# Patient Record
Sex: FEMALE | Race: WHITE | HISPANIC OR LATINO | URBAN - METROPOLITAN AREA
[De-identification: names, ages, dates, MRNs, and addresses within clinical notes are randomized per-mention and may not be internally consistent; named-entity substitution may affect disease eponyms.]

---

## 2023-10-21 ENCOUNTER — TRANSCRIBE ORDERS (OUTPATIENT)
Dept: LAB | Facility: CLINIC | Age: 48
End: 2023-10-21

## 2023-10-21 ENCOUNTER — APPOINTMENT (OUTPATIENT)
Dept: LAB | Facility: CLINIC | Age: 48
End: 2023-10-21
Payer: COMMERCIAL

## 2023-10-21 DIAGNOSIS — D50.8 IRON DEFICIENCY ANEMIA SECONDARY TO INADEQUATE DIETARY IRON INTAKE: ICD-10-CM

## 2023-10-21 DIAGNOSIS — N95.1 SYMPTOMATIC MENOPAUSAL OR FEMALE CLIMACTERIC STATES: ICD-10-CM

## 2023-10-21 DIAGNOSIS — N95.1 SYMPTOMATIC MENOPAUSAL OR FEMALE CLIMACTERIC STATES: Primary | ICD-10-CM

## 2023-10-21 LAB
ESTRADIOL SERPL-MCNC: 98 PG/ML
FERRITIN SERPL-MCNC: 3 NG/ML (ref 11–307)
IRON SATN MFR SERPL: 2 % (ref 15–50)
IRON SERPL-MCNC: 16 UG/DL (ref 50–212)
PROGEST SERPL-MCNC: <0.1 NG/ML
PROLACTIN SERPL-MCNC: 11.62 NG/ML (ref 3.34–26.72)
RETICS # AUTO: NORMAL 10*3/UL (ref 14097–95744)
RETICS # CALC: 1.65 % (ref 0.37–1.87)
TIBC SERPL-MCNC: 688 UG/DL (ref 250–450)
TRANSFERRIN SERPL-MCNC: 375 MG/DL (ref 203–362)
UIBC SERPL-MCNC: 672 UG/DL (ref 155–355)

## 2023-10-21 PROCEDURE — 82728 ASSAY OF FERRITIN: CPT

## 2023-10-21 PROCEDURE — 82627 DEHYDROEPIANDROSTERONE: CPT

## 2023-10-21 PROCEDURE — 84403 ASSAY OF TOTAL TESTOSTERONE: CPT

## 2023-10-21 PROCEDURE — 84466 ASSAY OF TRANSFERRIN: CPT

## 2023-10-21 PROCEDURE — 84146 ASSAY OF PROLACTIN: CPT

## 2023-10-21 PROCEDURE — 85045 AUTOMATED RETICULOCYTE COUNT: CPT

## 2023-10-21 PROCEDURE — 82670 ASSAY OF TOTAL ESTRADIOL: CPT

## 2023-10-21 PROCEDURE — 83550 IRON BINDING TEST: CPT

## 2023-10-21 PROCEDURE — 84144 ASSAY OF PROGESTERONE: CPT

## 2023-10-21 PROCEDURE — 36415 COLL VENOUS BLD VENIPUNCTURE: CPT

## 2023-10-21 PROCEDURE — 83540 ASSAY OF IRON: CPT

## 2023-10-21 PROCEDURE — 84402 ASSAY OF FREE TESTOSTERONE: CPT

## 2023-10-21 PROCEDURE — 84270 ASSAY OF SEX HORMONE GLOBUL: CPT

## 2023-10-22 LAB
DHEA-S SERPL-MCNC: 186 UG/DL (ref 41.2–243.7)
SHBG SERPL-SCNC: 91.2 NMOL/L (ref 24.6–122)

## 2023-10-23 LAB
TESTOST FREE SERPL-MCNC: 1.2 PG/ML (ref 0–4.2)
TESTOST SERPL-MCNC: 146 NG/DL (ref 4–50)

## 2024-04-25 ENCOUNTER — OFFICE VISIT (OUTPATIENT)
Dept: FAMILY MEDICINE CLINIC | Facility: CLINIC | Age: 49
End: 2024-04-25
Payer: COMMERCIAL

## 2024-04-25 VITALS
OXYGEN SATURATION: 99 % | SYSTOLIC BLOOD PRESSURE: 120 MMHG | HEART RATE: 66 BPM | WEIGHT: 221 LBS | DIASTOLIC BLOOD PRESSURE: 80 MMHG | HEIGHT: 64 IN | BODY MASS INDEX: 37.73 KG/M2

## 2024-04-25 DIAGNOSIS — Z78.0 MENOPAUSE: ICD-10-CM

## 2024-04-25 DIAGNOSIS — D50.9 IRON DEFICIENCY ANEMIA, UNSPECIFIED IRON DEFICIENCY ANEMIA TYPE: ICD-10-CM

## 2024-04-25 DIAGNOSIS — Z12.39 ENCOUNTER FOR SCREENING FOR MALIGNANT NEOPLASM OF BREAST, UNSPECIFIED SCREENING MODALITY: ICD-10-CM

## 2024-04-25 DIAGNOSIS — Z90.710 H/O: HYSTERECTOMY: ICD-10-CM

## 2024-04-25 DIAGNOSIS — Z90.49 HISTORY OF APPENDECTOMY: ICD-10-CM

## 2024-04-25 DIAGNOSIS — J30.1 SEASONAL ALLERGIC RHINITIS DUE TO POLLEN: Primary | ICD-10-CM

## 2024-04-25 DIAGNOSIS — E66.09 CLASS 2 OBESITY DUE TO EXCESS CALORIES WITHOUT SERIOUS COMORBIDITY WITH BODY MASS INDEX (BMI) OF 37.0 TO 37.9 IN ADULT: ICD-10-CM

## 2024-04-25 DIAGNOSIS — A04.8 H. PYLORI INFECTION: ICD-10-CM

## 2024-04-25 DIAGNOSIS — Z78.0 POSTMENOPAUSAL ESTROGEN DEFICIENCY: ICD-10-CM

## 2024-04-25 DIAGNOSIS — K63.5 POLYP OF COLON, UNSPECIFIED PART OF COLON, UNSPECIFIED TYPE: ICD-10-CM

## 2024-04-25 PROBLEM — G51.0 BELL'S PALSY: Status: ACTIVE | Noted: 2024-04-25

## 2024-04-25 PROBLEM — C64.9 RENAL CELL CANCER (HCC): Status: ACTIVE | Noted: 2024-04-25

## 2024-04-25 PROBLEM — Z90.3 H/O GASTRIC SLEEVE: Status: ACTIVE | Noted: 2024-04-25

## 2024-04-25 PROBLEM — E66.812 CLASS 2 OBESITY DUE TO EXCESS CALORIES WITHOUT SERIOUS COMORBIDITY WITH BODY MASS INDEX (BMI) OF 37.0 TO 37.9 IN ADULT: Status: ACTIVE | Noted: 2024-04-25

## 2024-04-25 PROBLEM — J45.909 CHILDHOOD ASTHMA: Status: ACTIVE | Noted: 2024-04-25

## 2024-04-25 PROBLEM — D64.9 ANEMIA: Status: ACTIVE | Noted: 2024-04-25

## 2024-04-25 PROBLEM — Z90.5 H/O LEFT NEPHRECTOMY: Status: ACTIVE | Noted: 2024-04-25

## 2024-04-25 PROBLEM — S83.281A ACUTE LATERAL MENISCUS TEAR OF RIGHT KNEE: Status: ACTIVE | Noted: 2024-04-25

## 2024-04-25 PROCEDURE — 99204 OFFICE O/P NEW MOD 45 MIN: CPT | Performed by: PHYSICIAN ASSISTANT

## 2024-04-25 RX ORDER — PROGESTERONE 100 MG/1
CAPSULE ORAL
COMMUNITY
Start: 2024-01-21

## 2024-04-25 RX ORDER — MONTELUKAST SODIUM 10 MG/1
10 TABLET ORAL
Qty: 30 TABLET | Refills: 5 | Status: SHIPPED | OUTPATIENT
Start: 2024-04-25

## 2024-04-25 RX ORDER — FLUTICASONE PROPIONATE 50 MCG
1 SPRAY, SUSPENSION (ML) NASAL DAILY
Start: 2024-04-25

## 2024-04-25 RX ORDER — OLOPATADINE HYDROCHLORIDE 1 MG/ML
1 SOLUTION/ DROPS OPHTHALMIC 2 TIMES DAILY
Start: 2024-04-25

## 2024-04-25 RX ORDER — FEXOFENADINE HCL 180 MG/1
180 TABLET ORAL DAILY
Start: 2024-04-25

## 2024-04-25 NOTE — PATIENT INSTRUCTIONS
Assessment/plan:  1.  Allergic rhinitis-not at goal.  Will add Singulair 10 mg daily.  Continue Allegra 180 mg, Flonase, and Patanol.  Consider further referral to allergist if symptoms or not improving.  2.  Childhood asthma-patient has seemed to outgrow this.  She has not needed inhaler therapy in 2 decades.  She did have some exacerbation of symptoms during COVID but has been fine since.  3.  Premature menopause-patient is currently using hormone replacement therapy through company, [employer] in [facility].  Would recommend referral to gynecology for evaluation.  Will order labs and assess DEXA scan.  4.  Colon polyps-recommend referral to gastroenterology for reevaluation.  Last colonoscopy was approximately 8 or 9 years ago.  5.  Anemia-recommend reassessing ferritin level and hemoglobin.  6.  History of meniscus repair-stable status post surgery  7.  Bell's palsy-resolved.  8.  Obesity-patient continues healthy diet and exercise efforts.  Consider future referral to weight management team.  9.  Renal cell cancer-stable status post nephrectomy.  Will reassess renal function testing.

## 2024-04-25 NOTE — PROGRESS NOTES
Name: Debbie Hendricks      : 1975      MRN: [id number]  Encounter Provider: Broderick Chavez PA-C  Encounter Date: 2024   Encounter department: Pending sale to [facility]    Assessment & Plan     Patient Instructions   Assessment/plan:  1.  Allergic rhinitis-not at goal.  Will add Singulair 10 mg daily.  Continue Allegra 180 mg, Flonase, and Patanol.  Consider further referral to allergist if symptoms or not improving.  2.  Childhood asthma-patient has seemed to outgrow this.  She has not needed inhaler therapy in 2 decades.  She did have some exacerbation of symptoms during COVID but has been fine since.  3.  Premature menopause-patient is currently using hormone replacement therapy through [employer] in [facility].  Would recommend referral to gynecology for evaluation.  Will order labs and assess DEXA scan.  4.  Colon polyps-recommend referral to gastroenterology for reevaluation.  Last colonoscopy was approximately 8 or 9 years ago.  5.  Anemia-recommend reassessing ferritin level and hemoglobin.  6.  History of meniscus repair-stable status post surgery  7.  Bell's palsy-resolved.  8.  Obesity-patient continues healthy diet and exercise efforts.  Consider future referral to weight management team.  9.  Renal cell cancer-stable status post nephrectomy.  Will reassess renal function testing.   1. Seasonal allergic rhinitis due to pollen  -     CBC and differential  -     Comprehensive metabolic panel  -     Lipid Panel with Direct LDL reflex  -     TSH, 3rd generation with Free T4 reflex  -     Follicle stimulating hormone; Future  -     Luteinizing hormone; Future  -     Estrogens, total; Future  -     Progesterone; Future  -     fexofenadine (ALLEGRA) 180 MG tablet; Take 1 tablet (180 mg total) by mouth daily  -     olopatadine (PATANOL) 0.1 % ophthalmic solution; Administer 1 drop to both eyes 2 (two) times a day  -     fluticasone (FLONASE) 50 mcg/act nasal spray; 1 spray into  each nostril daily  -     DHEA-sulfate; Future  -     Insulin-like growth factor 1 (IGF-1); Future  -     montelukast (SINGULAIR) 10 mg tablet; Take 1 tablet (10 mg total) by mouth daily at bedtime    2. Menopause  -     CBC and differential  -     Comprehensive metabolic panel  -     Lipid Panel with Direct LDL reflex  -     TSH, 3rd generation with Free T4 reflex  -     Follicle stimulating hormone; Future  -     Luteinizing hormone; Future  -     Estrogens, total; Future  -     Progesterone; Future  -     fexofenadine (ALLEGRA) 180 MG tablet; Take 1 tablet (180 mg total) by mouth daily  -     olopatadine (PATANOL) 0.1 % ophthalmic solution; Administer 1 drop to both eyes 2 (two) times a day  -     fluticasone (FLONASE) 50 mcg/act nasal spray; 1 spray into each nostril daily  -     Ambulatory Referral to Obstetrics / Gynecology; Future  -     DHEA-sulfate; Future  -     Insulin-like growth factor 1 (IGF-1); Future    3. Class 2 obesity due to excess calories without serious comorbidity with body mass index (BMI) of 37.0 to 37.9 in adult  -     CBC and differential  -     Comprehensive metabolic panel  -     Lipid Panel with Direct LDL reflex  -     TSH, 3rd generation with Free T4 reflex  -     Follicle stimulating hormone; Future  -     Luteinizing hormone; Future  -     Estrogens, total; Future  -     Progesterone; Future  -     fexofenadine (ALLEGRA) 180 MG tablet; Take 1 tablet (180 mg total) by mouth daily  -     olopatadine (PATANOL) 0.1 % ophthalmic solution; Administer 1 drop to both eyes 2 (two) times a day  -     fluticasone (FLONASE) 50 mcg/act nasal spray; 1 spray into each nostril daily  -     DHEA-sulfate; Future  -     Insulin-like growth factor 1 (IGF-1); Future    4. History of appendectomy    5. H/O: hysterectomy  -     Ambulatory Referral to Obstetrics / Gynecology; Future    6. Postmenopausal estrogen deficiency  -     CBC and differential  -     Comprehensive metabolic panel  -     Lipid Panel  with Direct LDL reflex  -     TSH, 3rd generation with Free T4 reflex  -     Follicle stimulating hormone; Future  -     Luteinizing hormone; Future  -     Estrogens, total; Future  -     Progesterone; Future  -     fexofenadine (ALLEGRA) 180 MG tablet; Take 1 tablet (180 mg total) by mouth daily  -     olopatadine (PATANOL) 0.1 % ophthalmic solution; Administer 1 drop to both eyes 2 (two) times a day  -     fluticasone (FLONASE) 50 mcg/act nasal spray; 1 spray into each nostril daily  -     DXA bone density spine hip and pelvis; Future; Expected date: 04/25/2024  -     DHEA-sulfate; Future  -     Insulin-like growth factor 1 (IGF-1); Future    7. Encounter for screening for malignant neoplasm of breast, unspecified screening modality  -     Mammo screening bilateral w 3d & cad; Future    8. Polyp of colon, unspecified part of colon, unspecified type  -     Ambulatory Referral to Gastroenterology; Future    9. Iron deficiency anemia, unspecified iron deficiency anemia type  -     CBC and differential  -     Comprehensive metabolic panel  -     Lipid Panel with Direct LDL reflex  -     TSH, 3rd generation with Free T4 reflex  -     Follicle stimulating hormone; Future  -     Luteinizing hormone; Future  -     Estrogens, total; Future  -     Progesterone; Future  -     fexofenadine (ALLEGRA) 180 MG tablet; Take 1 tablet (180 mg total) by mouth daily  -     olopatadine (PATANOL) 0.1 % ophthalmic solution; Administer 1 drop to both eyes 2 (two) times a day  -     fluticasone (FLONASE) 50 mcg/act nasal spray; 1 spray into each nostril daily  -     Ferritin  -     DHEA-sulfate; Future  -     Insulin-like growth factor 1 (IGF-1); Future    10. H. pylori infection        Depression Screening and Follow-up Plan: Patient was screened for depression during today's encounter. They screened negative with a PHQ-2 score of 0.        Subjective      HPI: This is a 48-year-old female who presents to the office with her  as a  "new patient to the office.  She complains of ongoing problems with seasonal allergies.  She does continue over-the-counter Allegra, Flonase, and Patanol with a little relief.  She is still having some discomfort in the ears and itchy watery eyes at times.  She does have a history of childhood asthma but has not needed an inhaler for 2 decades.  She also has history of premature menopause and had history of hysterectomy.  She also had history of cholecystectomy and appendectomy.  She has also had problems with iron deficiency anemia.  She has had colon polyps and has not had colonoscopy in about 8 or 9 years.  She does continue hormone therapy through a company called [employer] in [facility].  She is interested in getting set up with gynecology in the area for evaluation and she is also due for mammography.      Review of Systems   Constitutional:  Negative for chills, fatigue and fever.   HENT:  Positive for congestion, ear pain, postnasal drip and rhinorrhea. Negative for sinus pressure.    Eyes:  Positive for itching. Negative for visual disturbance.   Respiratory:  Negative for cough, chest tightness and shortness of breath.    Cardiovascular:  Negative for chest pain and palpitations.   Gastrointestinal:  Negative for diarrhea, nausea and vomiting.   Endocrine: Negative for polyuria.   Genitourinary:  Negative for dysuria and frequency.   Musculoskeletal:  Negative for arthralgias and myalgias.   Skin:  Negative for pallor and rash.   Neurological:  Negative for dizziness, weakness, light-headedness, numbness and headaches.   Psychiatric/Behavioral:  Negative for agitation, behavioral problems and sleep disturbance.    All other systems reviewed and are negative.      Current Outpatient Medications on File Prior to Visit   Medication Sig   • Progesterone 100 MG CAPS        Objective     /80 (BP Location: Right arm, Patient Position: Sitting, Cuff Size: Adult)   Pulse 66   Ht 5' 4\" (1.626 m)   Wt " 100 kg (221 lb)   SpO2 99%   BMI 37.93 kg/m²     Physical Exam  Vitals and nursing note reviewed.   Constitutional:       General: She is not in acute distress.     Appearance: She is well-developed.   HENT:      Head: Normocephalic and atraumatic.      Right Ear: External ear normal.      Left Ear: External ear normal.      Nose: Nose normal.      Mouth/Throat:      Pharynx: No oropharyngeal exudate.   Eyes:      Conjunctiva/sclera: Conjunctivae normal.      Pupils: Pupils are equal, round, and reactive to light.   Neck:      Thyroid: No thyromegaly.      Trachea: No tracheal deviation.   Cardiovascular:      Rate and Rhythm: Normal rate and regular rhythm.      Heart sounds: Normal heart sounds. No murmur heard.     No friction rub.   Pulmonary:      Effort: Pulmonary effort is normal. No respiratory distress.      Breath sounds: Normal breath sounds. No wheezing or rales.   Abdominal:      General: Bowel sounds are normal. There is no distension.      Palpations: Abdomen is soft.      Tenderness: There is no abdominal tenderness. There is no guarding or rebound.   Musculoskeletal:         General: No tenderness. Normal range of motion.      Cervical back: Normal range of motion and neck supple.   Lymphadenopathy:      Cervical: No cervical adenopathy.   Skin:     General: Skin is warm and dry.      Findings: No erythema or rash.   Neurological:      Mental Status: She is alert and oriented to person, place, and time.      Cranial Nerves: No cranial nerve deficit.      Coordination: Coordination normal.   Psychiatric:         Behavior: Behavior normal.         Thought Content: Thought content normal.       Broderick Chavez PA-C

## 2024-04-27 ENCOUNTER — APPOINTMENT (OUTPATIENT)
Dept: LAB | Facility: CLINIC | Age: 49
End: 2024-04-27
Payer: COMMERCIAL

## 2024-04-27 DIAGNOSIS — J30.1 SEASONAL ALLERGIC RHINITIS DUE TO POLLEN: ICD-10-CM

## 2024-04-27 DIAGNOSIS — D50.9 IRON DEFICIENCY ANEMIA, UNSPECIFIED IRON DEFICIENCY ANEMIA TYPE: ICD-10-CM

## 2024-04-27 DIAGNOSIS — Z78.0 MENOPAUSE: ICD-10-CM

## 2024-04-27 DIAGNOSIS — Z78.0 POSTMENOPAUSAL ESTROGEN DEFICIENCY: ICD-10-CM

## 2024-04-27 DIAGNOSIS — E66.09 CLASS 2 OBESITY DUE TO EXCESS CALORIES WITHOUT SERIOUS COMORBIDITY WITH BODY MASS INDEX (BMI) OF 37.0 TO 37.9 IN ADULT: ICD-10-CM

## 2024-04-27 LAB
ALBUMIN SERPL BCP-MCNC: 4.2 G/DL (ref 3.5–5)
ALP SERPL-CCNC: 35 U/L (ref 34–104)
ALT SERPL W P-5'-P-CCNC: 10 U/L (ref 7–52)
ANION GAP SERPL CALCULATED.3IONS-SCNC: 10 MMOL/L (ref 4–13)
AST SERPL W P-5'-P-CCNC: 11 U/L (ref 13–39)
BASOPHILS # BLD AUTO: 0.07 THOUSANDS/ÂΜL (ref 0–0.1)
BASOPHILS NFR BLD AUTO: 1 % (ref 0–1)
BILIRUB SERPL-MCNC: 0.4 MG/DL (ref 0.2–1)
BUN SERPL-MCNC: 13 MG/DL (ref 5–25)
CALCIUM SERPL-MCNC: 9.2 MG/DL (ref 8.4–10.2)
CHLORIDE SERPL-SCNC: 104 MMOL/L (ref 96–108)
CHOLEST SERPL-MCNC: 320 MG/DL
CO2 SERPL-SCNC: 26 MMOL/L (ref 21–32)
CREAT SERPL-MCNC: 0.73 MG/DL (ref 0.6–1.3)
EOSINOPHIL # BLD AUTO: 0.26 THOUSAND/ÂΜL (ref 0–0.61)
EOSINOPHIL NFR BLD AUTO: 5 % (ref 0–6)
ERYTHROCYTE [DISTWIDTH] IN BLOOD BY AUTOMATED COUNT: 18.7 % (ref 11.6–15.1)
FERRITIN SERPL-MCNC: 3 NG/ML (ref 11–307)
FSH SERPL-ACNC: 38.4 MIU/ML
GFR SERPL CREATININE-BSD FRML MDRD: 97 ML/MIN/1.73SQ M
GLUCOSE P FAST SERPL-MCNC: 89 MG/DL (ref 65–99)
HCT VFR BLD AUTO: 34.5 % (ref 34.8–46.1)
HDLC SERPL-MCNC: 70 MG/DL
HGB BLD-MCNC: 10.9 G/DL (ref 11.5–15.4)
IMM GRANULOCYTES # BLD AUTO: 0.01 THOUSAND/UL (ref 0–0.2)
IMM GRANULOCYTES NFR BLD AUTO: 0 % (ref 0–2)
LDLC SERPL CALC-MCNC: 205 MG/DL (ref 0–100)
LH SERPL-ACNC: 28.1 MIU/ML
LYMPHOCYTES # BLD AUTO: 1.68 THOUSANDS/ÂΜL (ref 0.6–4.47)
LYMPHOCYTES NFR BLD AUTO: 32 % (ref 14–44)
MCH RBC QN AUTO: 20.4 PG (ref 26.8–34.3)
MCHC RBC AUTO-ENTMCNC: 31.6 G/DL (ref 31.4–37.4)
MCV RBC AUTO: 65 FL (ref 82–98)
MONOCYTES # BLD AUTO: 0.33 THOUSAND/ÂΜL (ref 0.17–1.22)
MONOCYTES NFR BLD AUTO: 6 % (ref 4–12)
NEUTROPHILS # BLD AUTO: 2.89 THOUSANDS/ÂΜL (ref 1.85–7.62)
NEUTS SEG NFR BLD AUTO: 56 % (ref 43–75)
NRBC BLD AUTO-RTO: 0 /100 WBCS
PLATELET # BLD AUTO: 432 THOUSANDS/UL (ref 149–390)
PMV BLD AUTO: 9.2 FL (ref 8.9–12.7)
POTASSIUM SERPL-SCNC: 3.7 MMOL/L (ref 3.5–5.3)
PROGEST SERPL-MCNC: 0.39 NG/ML
PROT SERPL-MCNC: 7.4 G/DL (ref 6.4–8.4)
RBC # BLD AUTO: 5.35 MILLION/UL (ref 3.81–5.12)
SODIUM SERPL-SCNC: 140 MMOL/L (ref 135–147)
TRIGL SERPL-MCNC: 226 MG/DL
TSH SERPL DL<=0.05 MIU/L-ACNC: 2.36 UIU/ML (ref 0.45–4.5)
WBC # BLD AUTO: 5.24 THOUSAND/UL (ref 4.31–10.16)

## 2024-04-27 PROCEDURE — 82627 DEHYDROEPIANDROSTERONE: CPT

## 2024-04-27 PROCEDURE — 82728 ASSAY OF FERRITIN: CPT | Performed by: PHYSICIAN ASSISTANT

## 2024-04-27 PROCEDURE — 85025 COMPLETE CBC W/AUTO DIFF WBC: CPT | Performed by: PHYSICIAN ASSISTANT

## 2024-04-27 PROCEDURE — 36415 COLL VENOUS BLD VENIPUNCTURE: CPT

## 2024-04-27 PROCEDURE — 80053 COMPREHEN METABOLIC PANEL: CPT | Performed by: PHYSICIAN ASSISTANT

## 2024-04-27 PROCEDURE — 84443 ASSAY THYROID STIM HORMONE: CPT | Performed by: PHYSICIAN ASSISTANT

## 2024-04-27 PROCEDURE — 84144 ASSAY OF PROGESTERONE: CPT

## 2024-04-27 PROCEDURE — 83002 ASSAY OF GONADOTROPIN (LH): CPT

## 2024-04-27 PROCEDURE — 80061 LIPID PANEL: CPT | Performed by: PHYSICIAN ASSISTANT

## 2024-04-27 PROCEDURE — 84305 ASSAY OF SOMATOMEDIN: CPT

## 2024-04-27 PROCEDURE — 82672 ASSAY OF ESTROGEN: CPT

## 2024-04-27 PROCEDURE — 83001 ASSAY OF GONADOTROPIN (FSH): CPT

## 2024-04-28 LAB — DHEA-S SERPL-MCNC: 200 UG/DL (ref 41.2–243.7)

## 2024-04-29 ENCOUNTER — TELEPHONE (OUTPATIENT)
Age: 49
End: 2024-04-29

## 2024-04-29 DIAGNOSIS — Z92.241 HISTORY OF ANABOLIC STEROID USE: Primary | ICD-10-CM

## 2024-04-29 LAB — IGF-I SERPL-MCNC: 84 NG/ML (ref 70–225)

## 2024-04-29 NOTE — TELEPHONE ENCOUNTER
Patient requesting lab results done 4/27/24. Some labs are still pending.    Patient requesting Testosterone Free and Iron testing to be added as well.    Patient is scheduled to be seen on 5/3/24 to go over all her labs    Please review and advice.  Thank you

## 2024-04-29 NOTE — TELEPHONE ENCOUNTER
Received call from patient asking if Broderick Chavez can please call her to discuss lab results and need for either Iron infusion or oral iron supplement.  Patient would like to discuss these options with the provider.  Please return patient's call at [phone].

## 2024-04-30 ENCOUNTER — TELEPHONE (OUTPATIENT)
Dept: FAMILY MEDICINE CLINIC | Facility: CLINIC | Age: 49
End: 2024-04-30

## 2024-04-30 ENCOUNTER — TELEPHONE (OUTPATIENT)
Dept: HEMATOLOGY ONCOLOGY | Facility: CLINIC | Age: 49
End: 2024-04-30

## 2024-04-30 DIAGNOSIS — D50.9 IRON DEFICIENCY ANEMIA, UNSPECIFIED IRON DEFICIENCY ANEMIA TYPE: Primary | ICD-10-CM

## 2024-04-30 NOTE — TELEPHONE ENCOUNTER
Testosterone was added.  For the iron level a ferritin was completed and this is significantly low.  Referral for hematology was placed.

## 2024-04-30 NOTE — TELEPHONE ENCOUNTER
Debbie called in response to a referral that was received for patient to establish care with Hematology.     Outreach was made to schedule a consultation.    A consultation was scheduled for patient during this call. Patient is scheduled on 5/13/24 at 2:00 PM with Dr Rainey at the [facility]

## 2024-04-30 NOTE — TELEPHONE ENCOUNTER
----- Message from Elenita Escobedo sent at 4/29/2024  6:23 PM EDT -----  Test results review thru MyChart. Patient agree to see hematology please placed referral. Also patient questioning if she needs a testosterone test to be done.

## 2024-04-30 NOTE — TELEPHONE ENCOUNTER
Pt has been informed of results. Hematology phone number [phone] has been provided she will call after work.

## 2024-04-30 NOTE — TELEPHONE ENCOUNTER
Order for hematology referral placed.  I am not sure what supplements she is taking through her specialist in [facility] but if they would like a testosterone ordered I can place one, otherwise I would not normally test for this in a female.

## 2024-04-30 NOTE — TELEPHONE ENCOUNTER
Pt has been informed of results. Hematology phone number [phone] has been provided she will call after work.

## 2024-05-01 ENCOUNTER — APPOINTMENT (OUTPATIENT)
Dept: LAB | Facility: CLINIC | Age: 49
End: 2024-05-01
Payer: COMMERCIAL

## 2024-05-01 DIAGNOSIS — Z92.241 HISTORY OF ANABOLIC STEROID USE: ICD-10-CM

## 2024-05-01 PROCEDURE — 84403 ASSAY OF TOTAL TESTOSTERONE: CPT

## 2024-05-01 PROCEDURE — 84402 ASSAY OF FREE TESTOSTERONE: CPT

## 2024-05-01 PROCEDURE — 36415 COLL VENOUS BLD VENIPUNCTURE: CPT

## 2024-05-02 LAB
TESTOST FREE SERPL-MCNC: 0.5 PG/ML (ref 0–4.2)
TESTOST SERPL-MCNC: 11 NG/DL (ref 4–50)

## 2024-05-03 LAB — ESTROGEN SERPL-MCNC: 84 PG/ML

## 2024-05-07 ENCOUNTER — TELEPHONE (OUTPATIENT)
Age: 49
End: 2024-05-07

## 2024-05-07 ENCOUNTER — HOSPITAL ENCOUNTER (OUTPATIENT)
Dept: MAMMOGRAPHY | Facility: MEDICAL CENTER | Age: 49
Discharge: HOME | End: 2024-05-07
Payer: COMMERCIAL

## 2024-05-07 VITALS — HEIGHT: 64 IN | BODY MASS INDEX: 37.73 KG/M2 | WEIGHT: 221 LBS

## 2024-05-07 DIAGNOSIS — Z12.31 ENCOUNTER FOR SCREENING MAMMOGRAM FOR MALIGNANT NEOPLASM OF BREAST: ICD-10-CM

## 2024-05-07 DIAGNOSIS — Z12.39 ENCOUNTER FOR SCREENING FOR MALIGNANT NEOPLASM OF BREAST, UNSPECIFIED SCREENING MODALITY: ICD-10-CM

## 2024-05-07 PROCEDURE — 77063 BREAST TOMOSYNTHESIS BI: CPT

## 2024-05-07 PROCEDURE — 77067 SCR MAMMO BI INCL CAD: CPT

## 2024-05-07 NOTE — TELEPHONE ENCOUNTER
Patient requesting an EGD at the same time as colonoscopy / AO was completed when patient requested EGD consult /  appt scheduled for 5/16/2024 with ROLANDO Ramirez in [place].

## 2024-05-07 NOTE — TELEPHONE ENCOUNTER
APPOINTMENT REQUESTED     05/07/24  Screened by: Padmini Dupree    Referring Provider PCP    Pre- Screening:     There is no height or weight on file to calculate BMI. BMI - 37.93  Has patient been referred for a routine screening Colonoscopy? yes  Is the patient between 45-75 years old? yes      Previous Colonoscopy yes   If yes:    Date:     Facility: in [place]    Reason: Screening        Does the patient want to see a Gastroenterologist prior to their procedure OR are they having any GI symptoms? yes    Has the patient been hospitalized or had abdominal surgery in the past 6 months? no    Does the patient use supplemental oxygen? no    Does the patient take Coumadin, Lovenox, Plavix, Elliquis, Xarelto, or other blood thinning medication? no    Has the patient had a stroke, cardiac event, or stent placed in the past year? no      If patient is between 45yrs - 49yrs, please advise patient that we will have to confirm benefits & coverage with their insurance company for a routine screening colonoscopy.

## 2024-05-13 ENCOUNTER — CONSULT (OUTPATIENT)
Age: 49
End: 2024-05-13
Payer: COMMERCIAL

## 2024-05-13 ENCOUNTER — TELEPHONE (OUTPATIENT)
Age: 49
End: 2024-05-13

## 2024-05-13 VITALS
OXYGEN SATURATION: 99 % | DIASTOLIC BLOOD PRESSURE: 68 MMHG | HEART RATE: 72 BPM | WEIGHT: 199 LBS | SYSTOLIC BLOOD PRESSURE: 128 MMHG | HEIGHT: 64 IN | BODY MASS INDEX: 33.97 KG/M2 | RESPIRATION RATE: 18 BRPM

## 2024-05-13 DIAGNOSIS — Z90.3 H/O GASTRIC SLEEVE: Primary | ICD-10-CM

## 2024-05-13 DIAGNOSIS — D50.9 IRON DEFICIENCY ANEMIA, UNSPECIFIED IRON DEFICIENCY ANEMIA TYPE: ICD-10-CM

## 2024-05-13 DIAGNOSIS — C64.9 RENAL CELL CARCINOMA, UNSPECIFIED LATERALITY (HCC): ICD-10-CM

## 2024-05-13 PROBLEM — D50.8 IRON DEFICIENCY ANEMIA SECONDARY TO INADEQUATE DIETARY IRON INTAKE: Status: ACTIVE | Noted: 2024-05-13

## 2024-05-13 PROCEDURE — 99203 OFFICE O/P NEW LOW 30 MIN: CPT | Performed by: INTERNAL MEDICINE

## 2024-05-13 NOTE — PROGRESS NOTES
[facility]  [address]  [facility] [[email or web address]] [address]  [facility] [id number] [phone]     Date of Visit: 5/13/2024  Name: Debbie Hendricks   YOB: 1975         Assessment/Plan  In summary, Debbie Hendricks is a 49 y.o. female with severe and chronic iron deficiency anemia, most likely due to malabsorption. She has an upcoming appointment with GI to discuss endoscopies.   Will arrange for 2 doses of feraheme. Recheck labs 4-6 weeks later and replete more if needed.   Also check B12 and folate levels.     As for her h/o RCC, she is s/p nephrectomy in 2014. No surveillance imaging needed at this time.  For now continue MVI and RTC in 3-4 months    Return in about 3 months (around 8/13/2024) for Office visit with labs prior.     All the patient's questions were answered to their apparent satisfaction.  Patient has been strongly urged to call with any questions or concerns.     HISTORY OF PRESENT ILLNESS:   Debbie Hendricks is a 49 y.o. female  who has been referred for management of anemia. 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 Patient has a remote h/o RCC s/p nephrectomy in 2014. Also has a h/o hysterectomy for fibroids and gastric sleeve surgery in 2016. She is on MVI. She has received many transfusions and IV iron in the past without difficulty.     Labs from 4/27/24 - Hb: 10.9, Ferritin is 3. Previous labs from 10/21/23- ferritin 3 and trans sat is 2%, iron 16, high TIBC. Patient is on     Subjective  Patient here today with her fiance  Complains of fatigue    REVIEW OF SYSTEMS:  No bleeding  No pain  No RLS  14 point review of systems otherwise  negative      Current Outpatient Medications:     fexofenadine (ALLEGRA) 180 MG tablet, Take 1 tablet (180 mg total) by mouth daily, Disp: , Rfl:     fluticasone (FLONASE) 50 mcg/act nasal spray, 1 spray into each nostril daily, Disp: , Rfl:     montelukast (SINGULAIR) 10 mg tablet, Take  1 tablet (10 mg total) by mouth daily at bedtime, Disp: 30 tablet, Rfl: 5    olopatadine (PATANOL) 0.1 % ophthalmic solution, Administer 1 drop to both eyes 2 (two) times a day, Disp: , Rfl:     Progesterone 100 MG CAPS, , Disp: , Rfl:      Allergies   Allergen Reactions    [place] Oil - Food Allergy Other (See Comments)        ACTIVE PROBLEMS:  Patient Active Problem List   Diagnosis    Seasonal allergic rhinitis due to pollen    Class 2 obesity due to excess calories without serious comorbidity with body mass index (BMI) of 37.0 to 37.9 in adult    Childhood asthma    History of appendectomy    H/O left nephrectomy    Renal cell cancer (HCC)    Acute lateral meniscus tear of right knee    History of cholecystectomy    H/O: hysterectomy    H/O gastric sleeve    Colon polyps    Anemia    Naik's palsy    H. pylori infection    Iron deficiency anemia secondary to inadequate dietary iron intake          Past Medical History:   Diagnosis Date    Anemia, iron deficiency     Hyperlipemia     Obesity, Class II, BMI 35-39.9     Renal carcinoma, left (HCC) 2014    Seasonal allergies         Past Surgical History:   Procedure Laterality Date    APPENDECTOMY  1985    CHOLECYSTECTOMY  2017    GASTRIC BYPASS  2016    HYSTERECTOMY  2017    NEPHRECTOMY Left 2014    renal carcinoma    TUBAL LIGATION  1998        Social History     Socioeconomic History    Marital status: Single     Spouse name: None    Number of children: None    Years of education: None    Highest education level: None   Occupational History    None   Tobacco Use    Smoking status: Former     Current packs/day: 0.00     Types: Cigarettes     Quit date: 2003     Years since quittin.0    Smokeless tobacco: Never   Substance and Sexual Activity    Alcohol use: None    Drug use: None    Sexual activity: None   Other Topics Concern    None   Social History Narrative    None     Social Determinants of Health     Financial Resource Strain: Not on file   Food  "Insecurity: Not on file   Transportation Needs: Not on file   Physical Activity: Not on file   Stress: Not on file   Social Connections: Not on file   Intimate Partner Violence: Not on file   Housing Stability: Not on file        Family History   Problem Relation Age of Onset    Hyperlipidemia Mother     Heart disease Mother     Cancer Father         stomach & ? bladder    Diabetes Father     Hypertension Father     Hyperlipidemia Father     Stroke Father     Prostate cancer Father     Lung cancer Maternal Grandmother     No Known Problems Maternal Grandfather     No Known Problems Paternal Grandmother     No Known Problems Paternal Grandfather     No Known Problems Maternal Aunt     No Known Problems Paternal Aunt     No Known Problems Paternal Aunt     Breast cancer Neg Hx         Objective        Physical Exam  ECOG performance status: 0  Blood pressure 128/68, pulse 72, resp. rate 18, height 5' 4\" (1.626 m), weight 90.3 kg (199 lb), SpO2 99%.     General appearance: Not in acute distress, well appearing and well nourished.    Alert and oriented.    Normal breath sounds bilaterally.  Clear to auscultation without any added sounds  Heart sounds are normal.  No murmurs noted.    Abdomen is soft and nontender.  No rebound.  No evidence of ascites.   Extremities without any edema.    No focal deficits.        I reviewed lab data in the chart as noted above.  Additional labs as noted below    WBC   Date Value Ref Range Status   04/27/2024 5.24 4.31 - 10.16 Thousand/uL Final     Hemoglobin   Date Value Ref Range Status   04/27/2024 10.9 (L) 11.5 - 15.4 g/dL Final     Platelets   Date Value Ref Range Status   04/27/2024 432 (H) 149 - 390 Thousands/uL Final     MCV   Date Value Ref Range Status   04/27/2024 65 (L) 82 - 98 fL Final      Potassium   Date Value Ref Range Status   04/27/2024 3.7 3.5 - 5.3 mmol/L Final     Chloride   Date Value Ref Range Status   04/27/2024 104 96 - 108 mmol/L Final     CO2   Date Value Ref " "Range Status   04/27/2024 26 21 - 32 mmol/L Final     BUN   Date Value Ref Range Status   04/27/2024 13 5 - 25 mg/dL Final     Creatinine   Date Value Ref Range Status   04/27/2024 0.73 0.60 - 1.30 mg/dL Final     Comment:     Standardized to IDMS reference method     Calcium   Date Value Ref Range Status   04/27/2024 9.2 8.4 - 10.2 mg/dL Final     Albumin   Date Value Ref Range Status   04/27/2024 4.2 3.5 - 5.0 g/dL Final     Total Bilirubin   Date Value Ref Range Status   04/27/2024 0.40 0.20 - 1.00 mg/dL Final     Comment:     Use of this assay is not recommended for patients undergoing treatment with eltrombopag due to the potential for falsely elevated results.  N-acetyl-p-benzoquinone imine (metabolite of Acetaminophen) will generate erroneously low results in samples for patients that have taken an overdose of Acetaminophen.     Alkaline Phosphatase   Date Value Ref Range Status   04/27/2024 35 34 - 104 U/L Final     AST   Date Value Ref Range Status   04/27/2024 11 (L) 13 - 39 U/L Final     ALT   Date Value Ref Range Status   04/27/2024 10 7 - 52 U/L Final     Comment:     Specimen collection should occur prior to Sulfasalazine administration due to the potential for falsely depressed results.       No results found for: \"LDH\"  No results found for: \"TSH\"  No results found for: \"N2JLOCS\"   No results found for: \"FREET4\"      RECENT IMAGING:  No results found.     Total minutes spent reviewing EMR, seeing patient in clinic visit, documenting visit, placing treatment orders, and communicating with other medical providers: 35    Portions of the record may have been created with voice recognition software.  Occasional wrong word or \"sound a like\" substitutions may have occurred due to the inherent limitations of voice recognition software.  Read the chart carefully and recognize, using context, where substitutions have occurred.      "

## 2024-05-14 ENCOUNTER — APPOINTMENT (OUTPATIENT)
Dept: LAB | Facility: MEDICAL CENTER | Age: 49
End: 2024-05-14
Payer: COMMERCIAL

## 2024-05-14 ENCOUNTER — TELEPHONE (OUTPATIENT)
Age: 49
End: 2024-05-14

## 2024-05-14 ENCOUNTER — OFFICE VISIT (OUTPATIENT)
Dept: OBGYN CLINIC | Facility: MEDICAL CENTER | Age: 49
End: 2024-05-14
Payer: COMMERCIAL

## 2024-05-14 VITALS
SYSTOLIC BLOOD PRESSURE: 90 MMHG | BODY MASS INDEX: 34.19 KG/M2 | WEIGHT: 200.3 LBS | DIASTOLIC BLOOD PRESSURE: 60 MMHG | HEIGHT: 64 IN

## 2024-05-14 DIAGNOSIS — Z01.419 ENCOUNTER FOR WELL WOMAN EXAM WITH ROUTINE GYNECOLOGICAL EXAM: Primary | ICD-10-CM

## 2024-05-14 DIAGNOSIS — E53.8 B12 DEFICIENCY: Primary | ICD-10-CM

## 2024-05-14 DIAGNOSIS — C64.9 RENAL CELL CARCINOMA, UNSPECIFIED LATERALITY (HCC): ICD-10-CM

## 2024-05-14 DIAGNOSIS — E55.9 VITAMIN D DEFICIENCY: ICD-10-CM

## 2024-05-14 DIAGNOSIS — Z12.31 ENCOUNTER FOR SCREENING MAMMOGRAM FOR BREAST CANCER: ICD-10-CM

## 2024-05-14 DIAGNOSIS — Z90.3 H/O GASTRIC SLEEVE: ICD-10-CM

## 2024-05-14 DIAGNOSIS — D50.8 IRON DEFICIENCY ANEMIA SECONDARY TO INADEQUATE DIETARY IRON INTAKE: Primary | ICD-10-CM

## 2024-05-14 DIAGNOSIS — D50.9 IRON DEFICIENCY ANEMIA, UNSPECIFIED IRON DEFICIENCY ANEMIA TYPE: ICD-10-CM

## 2024-05-14 LAB
25(OH)D3 SERPL-MCNC: 24.9 NG/ML (ref 30–100)
FOLATE SERPL-MCNC: 15.1 NG/ML
VIT B12 SERPL-MCNC: 181 PG/ML (ref 180–914)

## 2024-05-14 PROCEDURE — 82746 ASSAY OF FOLIC ACID SERUM: CPT

## 2024-05-14 PROCEDURE — 82306 VITAMIN D 25 HYDROXY: CPT

## 2024-05-14 PROCEDURE — S0610 ANNUAL GYNECOLOGICAL EXAMINA: HCPCS | Performed by: OBSTETRICS & GYNECOLOGY

## 2024-05-14 PROCEDURE — 36415 COLL VENOUS BLD VENIPUNCTURE: CPT

## 2024-05-14 PROCEDURE — 82652 VIT D 1 25-DIHYDROXY: CPT

## 2024-05-14 PROCEDURE — 82607 VITAMIN B-12: CPT

## 2024-05-14 PROCEDURE — 84425 ASSAY OF VITAMIN B-1: CPT

## 2024-05-14 RX ORDER — SODIUM CHLORIDE 9 MG/ML
20 INJECTION, SOLUTION INTRAVENOUS ONCE
Status: CANCELLED | OUTPATIENT
Start: 2024-05-17

## 2024-05-14 RX ORDER — OMEGA-3S/DHA/EPA/FISH OIL/D3 300MG-1000
800 CAPSULE ORAL DAILY
Qty: 60 TABLET | Refills: 5 | Status: SHIPPED | OUTPATIENT
Start: 2024-05-14

## 2024-05-14 RX ORDER — CYANOCOBALAMIN 1000 UG/ML
1000 INJECTION, SOLUTION INTRAMUSCULAR; SUBCUTANEOUS ONCE
Status: CANCELLED | OUTPATIENT
Start: 2024-05-17

## 2024-05-14 NOTE — PROGRESS NOTES
"[facility] [address], [place]    ASSESSMENT/PLAN: Debbie Hendricks is a 49 y.o.  who presents for annual gynecologic exam.    Encounter for routine gynecologic examination  - Routine well woman exam completed today.  - Cervical Cancer Screening: Current ASCCP Guidelines reviewed. Last Pap: Not on file . Next Pap Due: d/c paps  - Breast Cancer Screening: Last Mammogram 05/07/2024, mammo ordered  - states had menopause early, on testosterone, estrogen, and progestreone by doctor from [place]    Additional problems addressed at this visit:  1. Encounter for well woman exam with routine gynecological exam    2. Encounter for screening mammogram for breast cancer  -     Mammo screening bilateral w 3d & cad; Future; Expected date: 05/08/2025          CC:  Annual Gynecologic Examination    HPI: Debbie Hendricks is a 49 y.o. No obstetric history on file. who presents for annual gynecologic examination.  Gynecologic Exam       Patient presents for routine annual exam, states she went to menopause early.  She has been taking estrogen, testosterone, and progesterone with alleviation of her symptoms.  Interested in follow-up locally. Referred to Dr Velazquez  She does have a history of a nephrectomy for renal carcinoma.  The following portions of the patient's history were reviewed and updated as appropriate: allergies, current medications, past family history, past medical history, obstetric history, gynecologic history, past social history, past surgical history and problem list.    Review of Systems   Constitutional: Negative.    HENT: Negative.     Eyes: Negative.    Respiratory: Negative.     Cardiovascular: Negative.    Gastrointestinal: Negative.    Genitourinary: Negative.    Musculoskeletal: Negative.    All other systems reviewed and are negative.        Objective:  BP 90/60   Ht 5' 4\" (1.626 m)   Wt 90.9 kg (200 lb 4.8 oz)   BMI 34.38 kg/m²    Physical Exam  Vitals reviewed. "   Constitutional:       General: She is not in acute distress.     Appearance: She is well-developed.   HENT:      Head: Normocephalic and atraumatic.      Nose: Nose normal.   Cardiovascular:      Rate and Rhythm: Normal rate.   Pulmonary:      Effort: Pulmonary effort is normal. No respiratory distress.   Chest:   Breasts:     Breasts are symmetrical.      Right: Normal. No mass, nipple discharge, skin change or tenderness.      Left: Normal. No mass, nipple discharge, skin change or tenderness.   Abdominal:      General: There is no distension.      Palpations: Abdomen is soft. There is no mass.      Tenderness: There is no abdominal tenderness. There is no guarding or rebound.   Genitourinary:     General: Normal vulva.      Exam position: Lithotomy position.      Labia:         Right: No lesion.         Left: No lesion.       Urethra: No prolapse.      Vagina: Normal. No vaginal discharge, erythema or bleeding.      Uterus: Absent.       Adnexa: Right adnexa normal and left adnexa normal.   Musculoskeletal:         General: Normal range of motion.      Cervical back: Normal range of motion.   Lymphadenopathy:      Upper Body:      Right upper body: No supraclavicular, axillary or pectoral adenopathy.      Left upper body: No supraclavicular, axillary or pectoral adenopathy.      Lower Body: No right inguinal adenopathy. No left inguinal adenopathy.   Skin:     General: Skin is warm and dry.   Neurological:      Mental Status: She is alert and oriented to person, place, and time.   Psychiatric:         Behavior: Behavior normal.         Thought Content: Thought content normal.         Judgment: Judgment normal.             Melly Santo MD  [facility]  05/14/24 4:47 PM

## 2024-05-14 NOTE — TELEPHONE ENCOUNTER
Called and spoke to patient to let her know that we are going to give her B12 injections x4. She is already scheduled to start iron infusions on 5/28/24 but is requesting if we can start the iron infusions sooner. I let her know that we can schedule these and once they are approved by her insurance, we can work on moving them sooner if we need to. Patient voiced understanding.

## 2024-05-15 ENCOUNTER — TELEPHONE (OUTPATIENT)
Dept: HEMATOLOGY ONCOLOGY | Facility: CLINIC | Age: 49
End: 2024-05-15

## 2024-05-15 LAB — 1,25(OH)2D3 SERPL-MCNC: 57.5 PG/ML (ref 24.8–81.5)

## 2024-05-15 NOTE — LETTER
Dear Debbie,      Your health care provider referred you to the [facility] Cancer Risk and Genetics Program.  We were unable to reach you to schedule an appointment.  If you are interested in scheduling an appointment with one of our genetic counselors please reach out to our office at [phone]    Sincerely,    Cancer Risk and Genetics Program  [facility]

## 2024-05-15 NOTE — TELEPHONE ENCOUNTER
I spoke with Debbie to schedule their consultation with Cancer Risk and Genetics.     Scheduling Outcome: Patient is scheduled for an appointment on 1/16/24 at 1130 with Kristy Cormier    Personal/Family History Related to Appointment:     Personal History of Cancer: Patient reports personal history of renal    Family History of Cancer: Patient reports family history of father-prostate, bladder, stomach; maternal grandmother- lung    Is patient of Ashkenazi Islam Heritage?: No    History of Genetic Testing:  Personal History of Genetic Testing: Patient report no personal history of Genetic Testing.    Family History of Genetic Testing: Patient reports that no family members have had Genetic Testing.     Patient's preferred e-mail address: [email or web address]

## 2024-05-15 NOTE — TELEPHONE ENCOUNTER
I called Debbie in response to a referral that was received for patient to establish care with Cancer Risk and Genetics.     Outreach was made to schedule a consultation.    I left a voicemail explaining the reason for my call and advised patient to call [facility] at [phone].  The referral has been closed.        Dear Debbie,      Your health care provider referred you to the [facility] Cancer Risk and Genetics Program.  We were unable to reach you to schedule an appointment.  If you are interested in scheduling an appointment with one of our genetic counselors please reach out to our office at [phone]    Sincerely,    Cancer Risk and Genetics Program  [facility]

## 2024-05-16 ENCOUNTER — CONSULT (OUTPATIENT)
Dept: GASTROENTEROLOGY | Facility: CLINIC | Age: 49
End: 2024-05-16
Payer: COMMERCIAL

## 2024-05-16 VITALS
BODY MASS INDEX: 33.63 KG/M2 | WEIGHT: 197 LBS | DIASTOLIC BLOOD PRESSURE: 80 MMHG | SYSTOLIC BLOOD PRESSURE: 100 MMHG | HEIGHT: 64 IN | TEMPERATURE: 97.9 F

## 2024-05-16 DIAGNOSIS — K63.5 POLYP OF COLON, UNSPECIFIED PART OF COLON, UNSPECIFIED TYPE: ICD-10-CM

## 2024-05-16 DIAGNOSIS — D64.9 CHRONIC ANEMIA: Primary | ICD-10-CM

## 2024-05-16 DIAGNOSIS — Z90.3 S/P GASTRECTOMY: ICD-10-CM

## 2024-05-16 PROCEDURE — 99244 OFF/OP CNSLTJ NEW/EST MOD 40: CPT | Performed by: PHYSICIAN ASSISTANT

## 2024-05-16 RX ORDER — TESTOSTERONE CYPIONATE 200 MG/ML
50 INJECTION, SOLUTION INTRAMUSCULAR ONCE
COMMUNITY

## 2024-05-16 RX ORDER — ESTRADIOL VALERATE 20 MG/ML
20 INJECTION INTRAMUSCULAR ONCE
COMMUNITY

## 2024-05-16 NOTE — PATIENT INSTRUCTIONS
Diet for GERD  WHAT YOU NEED TO KNOW:   A diet for GERD  a meal plan that limits foods that irritate your stomach. Certain foods may worsen symptoms such as stomach pain, bloating, heartburn, or indigestion.  DISCHARGE INSTRUCTIONS:   Foods to limit or avoid:  You may need to avoid acidic, spicy, or high-fat foods. Not all foods affect everyone the same way. You will need to learn which foods worsen your symptoms and limit those foods. The following are some foods that may worsen ulcer or gastritis symptoms:  Beverages:      Whole milk and chocolate milk    Hot cocoa and cola    Any beverage with caffeine    Regular and decaffeinated coffee    Peppermint and spearmint tea    Green and black tea, with or without caffeine    Orange and grapefruit juices    Drinks that contain alcohol    Spices and seasonings:      Black and red pepper    Chili powder    Mustard seed and nutmeg    Other foods:      Dairy foods made from whole milk or cream    Chocolate    Spicy or strongly flavored cheeses, such as jalapeno or black pepper    Highly seasoned, high-fat meats, such as sausage, salami, sanon, ham, and cold cuts    Hot chiles and peppers    Tomato products, such as tomato paste, tomato sauce, or tomato juice    Foods to include:  Eat a variety of healthy foods from all the food groups. Eat fruits, vegetables, whole grains, and fat-free or low-fat dairy foods. Whole grains include whole-wheat breads, cereals, pasta, and brown rice. Choose lean meats, poultry (chicken and turkey), fish, beans, eggs, and nuts. A healthy meal plan is low in unhealthy fats, salt, and added sugar. Healthy fats include olive oil and canola oil. Ask your dietitian for more information about a healthy diet.       Other helpful guidelines:   Do not eat right before bedtime.  Stop eating at least 2 hours before bedtime.    Eat small, frequent meals.  Your stomach may tolerate small, frequent meals better than large meals.    © Copyright [employer] 2023  Information is for End User's use only and may not be sold, redistributed or otherwise used for commercial purposes.  The above information is an  only. It is not intended as medical advice for individual conditions or treatments. Talk to your doctor, nurse or pharmacist before following any medical regimen to see if it is safe and effective for you.  Scheduled date of EGD/colonoscopy (as of today):06.26.24  Physician performing EGD/colonoscopy:DR PEREIRA  Location of EGD/colonoscopy:[facility]  Desired bowel prep reviewed with patient:KALE  Instructions reviewed with patient by:RUSH  Clearances:  N/A

## 2024-05-16 NOTE — PROGRESS NOTES
"[facility] - Outpatient Consultation  Debbie Hendricks 49 y.o. female MRN: [id number]  Encounter: [id number]          ASSESSMENT AND PLAN:      Debbie is a 48 y/o female who is s/p cholecystectomy with chronic anemia who is s/p sleeve gastrectomy and has hx of RCC s/p L nephrectomy who presents for consultation of anemia.     1. Polyp of colon, unspecified part of colon, unspecified type  2. Chronic anemia  3. S/P gastrectomy  4. GERD  Pt says she was dx with anemia several years ago, she suspects in 2011 and shortly after this she was found to have renal mary carcinoma. She underwent L nephrectomy at that time. Pt is undergoing both vitamin B12 and iron infusions per hematology. She says her last colonoscopy was in [place] in 2016/2017 and was found to have a polyp. She suspects her last EGD was prior to her sleeve surgery 15+ years ago. She does indeed to feeling \"brief\" episodes of heartburn every day but says she would like to avoid taking any meds for this until after the EGD. Of note: her father ws dx with stomach cancer  - EGD and colonoscopy ordered to evaluate both her anemia and her heartburn  -I obtained informed consent from the patient. The risks/benefits/alternatives of the procedure were discussed with the patient. Risks included, but not limited to, infection, bleeding, perforation, injury to organs in the abdomen, missed lesion and incomplete procedure were discussed. Patient was agreeable and electronic signature was obtained.   -anti-gerd diet discussed and handout printed     -I did explain to pt that simply being s/p gastrectomy not only puts her at risk for the reflux but also could be the cause of malabsorption causing anemia     ______________________________________________________________________    HPI:  Pt says she was dx with anemia several years ago, she suspects in 2011 and shortly after this she was found to have renal mary carcinoma. She underwent L " "nephrectomy at that time. Pt is undergoing both vitamin B12 and iron infusions per hematology. She says her last colonoscopy was in [place] in 2016/2017 and was found to have a polyp. She suspects her last EGD was prior to her sleeve surgery 15+ years ago. She does indeed to feeling \"brief\" episodes of heartburn every day. She denies n/v, abdominal pain, family hx of colon cancer but her father was dx with stomach cancer. She denies unintentional weight loss, fevers, chills, night sweats, trouble swallowing, constipation, diarrhea, bloody BM. She says she did see black Bms in the past but has not seen this recently. She does not think this occurred when she was taking PO iron, though. She also has abdominal surgical hx of cholecystectomy, appendectomy and hysterectomy.       REVIEW OF SYSTEMS:    CONSTITUTIONAL: Denies any fever, chills, rigors, and weight loss.  HEENT: No earache or tinnitus. Denies hearing loss or visual disturbances.  CARDIOVASCULAR: No chest pain or palpitations.   RESPIRATORY: Denies any cough, hemoptysis, shortness of breath or dyspnea on exertion.  GASTROINTESTINAL: As noted in the History of Present Illness.   GENITOURINARY: No problems with urination. Denies any hematuria or dysuria.  NEUROLOGIC: No dizziness or vertigo, denies headaches.   MUSCULOSKELETAL: Denies any muscle or joint pain.   SKIN: Denies skin rashes or itching.   ENDOCRINE: Denies excessive thirst. Denies intolerance to heat or cold.  PSYCHOSOCIAL: Denies depression or anxiety. Denies any recent memory loss.       Historical Information   Past Medical History:   Diagnosis Date    Anemia, iron deficiency     Hyperlipemia     Obesity, Class II, BMI 35-39.9     Renal carcinoma, left (HCC) 2014    Seasonal allergies      Past Surgical History:   Procedure Laterality Date    APPENDECTOMY  1985    CHOLECYSTECTOMY  2017    GASTRIC BYPASS  2016    HYSTERECTOMY  2017    NEPHRECTOMY Left 2014    renal carcinoma    TUBAL LIGATION  " "     Social History   Social History     Substance and Sexual Activity   Alcohol Use None     Social History     Substance and Sexual Activity   Drug Use Not on file     Social History     Tobacco Use   Smoking Status Former    Current packs/day: 0.00    Types: Cigarettes    Quit date: 2003    Years since quittin.0   Smokeless Tobacco Never     Family History   Problem Relation Age of Onset    Hyperlipidemia Mother     Heart disease Mother     Cancer Father         stomach & ? bladder    Diabetes Father     Hypertension Father     Hyperlipidemia Father     Stroke Father     Prostate cancer Father     Lung cancer Maternal Grandmother     No Known Problems Maternal Grandfather     No Known Problems Paternal Grandmother     No Known Problems Paternal Grandfather     No Known Problems Maternal Aunt     No Known Problems Paternal Aunt     No Known Problems Paternal Aunt     Breast cancer Neg Hx        Meds/Allergies       Current Outpatient Medications:     cholecalciferol (VITAMIN D3) 400 units tablet    estradiol valerate (DELESTROGEN) 20 MG/ML injection    fexofenadine (ALLEGRA) 180 MG tablet    fluticasone (FLONASE) 50 mcg/act nasal spray    montelukast (SINGULAIR) 10 mg tablet    olopatadine (PATANOL) 0.1 % ophthalmic solution    polyethylene glycol (GOLYTELY) 4000 mL solution    Progesterone 100 MG CAPS    testosterone cypionate (DEPO-TESTOSTERONE) 200 mg/mL SOLN    Allergies   Allergen Reactions    [place] Oil - Food Allergy Other (See Comments)           Objective     Blood pressure 100/80, temperature 97.9 °F (36.6 °C), temperature source Tympanic, height 5' 4\" (1.626 m), weight 89.4 kg (197 lb). Body mass index is 33.81 kg/m².        PHYSICAL EXAM:      General Appearance:   Alert, cooperative, no distress   HEENT:   Normocephalic, atraumatic, anicteric.     Neck:  Supple, symmetrical, trachea midline   Lungs:   Clear to auscultation bilaterally; no rales, rhonchi or wheezing; respirations unlabored "    Heart::   Regular rate and rhythm; no murmur, rub, or gallop.   Abdomen:   Soft, non-tender, non-distended; normal bowel sounds; no masses, no organomegaly    Genitalia:   Deferred    Rectal:   Deferred    Extremities:  No cyanosis, clubbing or edema    Pulses:  2+ and symmetric    Skin:  No jaundice, rashes, or lesions    Lymph nodes:  No palpable cervical lymphadenopathy        Lab Results:   No visits with results within 1 Day(s) from this visit.   Latest known visit with results is:   Appointment on 05/14/2024   Component Date Value    Vitamin B-12 05/14/2024 181     Folate 05/14/2024 15.1     Vit D, 1,25-Dihydroxy 05/14/2024 57.5     Vit D, 25-Hydroxy 05/14/2024 24.9 (L)          Radiology Results:   No results found.

## 2024-05-17 ENCOUNTER — HOSPITAL ENCOUNTER (OUTPATIENT)
Dept: INFUSION CENTER | Facility: HOSPITAL | Age: 49
End: 2024-05-17
Attending: INTERNAL MEDICINE
Payer: COMMERCIAL

## 2024-05-17 VITALS
HEART RATE: 70 BPM | SYSTOLIC BLOOD PRESSURE: 113 MMHG | RESPIRATION RATE: 18 BRPM | DIASTOLIC BLOOD PRESSURE: 74 MMHG | TEMPERATURE: 97.6 F

## 2024-05-17 DIAGNOSIS — E53.8 B12 DEFICIENCY: Primary | ICD-10-CM

## 2024-05-17 RX ORDER — CYANOCOBALAMIN 1000 UG/ML
1000 INJECTION, SOLUTION INTRAMUSCULAR; SUBCUTANEOUS ONCE
Status: COMPLETED | OUTPATIENT
Start: 2024-05-17 | End: 2024-05-17

## 2024-05-17 RX ORDER — CYANOCOBALAMIN 1000 UG/ML
1000 INJECTION, SOLUTION INTRAMUSCULAR; SUBCUTANEOUS ONCE
Status: CANCELLED | OUTPATIENT
Start: 2024-05-24

## 2024-05-17 RX ADMIN — CYANOCOBALAMIN 1000 MCG: 1000 INJECTION, SOLUTION INTRAMUSCULAR at 15:09

## 2024-05-17 NOTE — PROGRESS NOTES
Debbie Hendricks  tolerated treatment well with no complications.      Debbie Hendricks is aware of future appt on 5/28 at 3pm.     AVS printed and given to Debbie Hendricks:  No (Declined by Debbie Hendricks)

## 2024-05-20 LAB — VIT B1 BLD-SCNC: 82.4 NMOL/L (ref 66.5–200)

## 2024-05-23 DIAGNOSIS — D50.8 IRON DEFICIENCY ANEMIA SECONDARY TO INADEQUATE DIETARY IRON INTAKE: Primary | ICD-10-CM

## 2024-05-23 RX ORDER — SODIUM CHLORIDE 9 MG/ML
20 INJECTION, SOLUTION INTRAVENOUS ONCE
Status: CANCELLED | OUTPATIENT
Start: 2024-05-28

## 2024-05-28 ENCOUNTER — HOSPITAL ENCOUNTER (OUTPATIENT)
Dept: INFUSION CENTER | Facility: HOSPITAL | Age: 49
Discharge: HOME | End: 2024-05-28
Attending: INTERNAL MEDICINE
Payer: COMMERCIAL

## 2024-05-28 VITALS
HEART RATE: 75 BPM | RESPIRATION RATE: 18 BRPM | TEMPERATURE: 97.4 F | DIASTOLIC BLOOD PRESSURE: 62 MMHG | SYSTOLIC BLOOD PRESSURE: 134 MMHG

## 2024-05-28 DIAGNOSIS — E53.8 B12 DEFICIENCY: Primary | ICD-10-CM

## 2024-05-28 DIAGNOSIS — D50.8 IRON DEFICIENCY ANEMIA SECONDARY TO INADEQUATE DIETARY IRON INTAKE: ICD-10-CM

## 2024-05-28 RX ORDER — CYANOCOBALAMIN 1000 UG/ML
1000 INJECTION, SOLUTION INTRAMUSCULAR; SUBCUTANEOUS ONCE
Status: COMPLETED | OUTPATIENT
Start: 2024-05-28 | End: 2024-05-28

## 2024-05-28 RX ORDER — SODIUM CHLORIDE 9 MG/ML
20 INJECTION, SOLUTION INTRAVENOUS ONCE
Status: COMPLETED | OUTPATIENT
Start: 2024-05-28 | End: 2024-05-28

## 2024-05-28 RX ORDER — CYANOCOBALAMIN 1000 UG/ML
1000 INJECTION, SOLUTION INTRAMUSCULAR; SUBCUTANEOUS ONCE
Status: CANCELLED | OUTPATIENT
Start: 2024-06-04

## 2024-05-28 RX ORDER — SODIUM CHLORIDE 9 MG/ML
20 INJECTION, SOLUTION INTRAVENOUS ONCE
Status: CANCELLED | OUTPATIENT
Start: 2024-06-04

## 2024-05-28 RX ADMIN — FERUMOXYTOL 510 MG: 510 INJECTION INTRAVENOUS at 15:34

## 2024-05-28 RX ADMIN — CYANOCOBALAMIN 1000 MCG: 1000 INJECTION, SOLUTION INTRAMUSCULAR at 15:38

## 2024-05-28 RX ADMIN — SODIUM CHLORIDE 20 ML/HR: 9 INJECTION, SOLUTION INTRAVENOUS at 15:34

## 2024-06-04 ENCOUNTER — HOSPITAL ENCOUNTER (OUTPATIENT)
Dept: INFUSION CENTER | Facility: HOSPITAL | Age: 49
Discharge: HOME | End: 2024-06-04
Attending: INTERNAL MEDICINE
Payer: COMMERCIAL

## 2024-06-04 DIAGNOSIS — E53.8 B12 DEFICIENCY: Primary | ICD-10-CM

## 2024-06-04 DIAGNOSIS — D50.8 IRON DEFICIENCY ANEMIA SECONDARY TO INADEQUATE DIETARY IRON INTAKE: ICD-10-CM

## 2024-06-04 RX ORDER — SODIUM CHLORIDE 9 MG/ML
20 INJECTION, SOLUTION INTRAVENOUS ONCE
Status: COMPLETED | OUTPATIENT
Start: 2024-06-04 | End: 2024-06-04

## 2024-06-04 RX ORDER — CYANOCOBALAMIN 1000 UG/ML
1000 INJECTION, SOLUTION INTRAMUSCULAR; SUBCUTANEOUS ONCE
OUTPATIENT
Start: 2024-06-11

## 2024-06-04 RX ORDER — CYANOCOBALAMIN 1000 UG/ML
1000 INJECTION, SOLUTION INTRAMUSCULAR; SUBCUTANEOUS ONCE
Status: COMPLETED | OUTPATIENT
Start: 2024-06-04 | End: 2024-06-04

## 2024-06-04 RX ORDER — SODIUM CHLORIDE 9 MG/ML
20 INJECTION, SOLUTION INTRAVENOUS ONCE
Status: CANCELLED | OUTPATIENT
Start: 2024-06-11

## 2024-06-04 RX ADMIN — SODIUM CHLORIDE 20 ML/HR: 9 INJECTION, SOLUTION INTRAVENOUS at 15:39

## 2024-06-04 RX ADMIN — FERUMOXYTOL 510 MG: 510 INJECTION INTRAVENOUS at 15:40

## 2024-06-04 RX ADMIN — CYANOCOBALAMIN 1000 MCG: 1000 INJECTION, SOLUTION INTRAMUSCULAR at 15:55

## 2024-06-04 NOTE — PROGRESS NOTES
Pt tolerated ferahem infusion with no adverse reactions. IV removed. Pt left ambulatory with steady gait. Declined AVS     Pt has no further appts. Plan is complete

## 2024-06-04 NOTE — PROGRESS NOTES
Debbie Hendricks  tolerated treatment well with no complications.      Debbie Hendricks has no future appointments with the infusion center at this time.     AVS printed and given to Debbie Hendricks:    No (Declined by Debbie Hendricks)

## 2024-06-11 ENCOUNTER — HOSPITAL ENCOUNTER (OUTPATIENT)
Dept: INFUSION CENTER | Facility: HOSPITAL | Age: 49
Discharge: HOME | End: 2024-06-11
Attending: INTERNAL MEDICINE
Payer: COMMERCIAL

## 2024-06-11 DIAGNOSIS — E53.8 B12 DEFICIENCY: Primary | ICD-10-CM

## 2024-06-11 PROCEDURE — 96372 THER/PROPH/DIAG INJ SC/IM: CPT

## 2024-06-11 RX ORDER — CYANOCOBALAMIN 1000 UG/ML
1000 INJECTION, SOLUTION INTRAMUSCULAR; SUBCUTANEOUS ONCE
Status: COMPLETED | OUTPATIENT
Start: 2024-06-11 | End: 2024-06-11

## 2024-06-11 RX ORDER — CYANOCOBALAMIN 1000 UG/ML
1000 INJECTION, SOLUTION INTRAMUSCULAR; SUBCUTANEOUS ONCE
Status: CANCELLED | OUTPATIENT
Start: 2024-06-18

## 2024-06-11 RX ADMIN — CYANOCOBALAMIN 1000 MCG: 1000 INJECTION, SOLUTION INTRAMUSCULAR at 16:14

## 2024-06-11 NOTE — PROGRESS NOTES
..Debbie Hendricks  tolerated treatment well with no complications.      Debbie Hendricks is aware of future appt on 6/18 at 4:00.     AVS printed and given to Debbie Hendricks: No (Declined by Debbie Hendricks)

## 2024-06-12 DIAGNOSIS — D50.8 IRON DEFICIENCY ANEMIA SECONDARY TO INADEQUATE DIETARY IRON INTAKE: Primary | ICD-10-CM

## 2024-06-12 RX ORDER — CYANOCOBALAMIN 1000 UG/ML
1000 INJECTION, SOLUTION INTRAMUSCULAR; SUBCUTANEOUS ONCE
OUTPATIENT
Start: 2024-06-18

## 2024-06-14 ENCOUNTER — TELEPHONE (OUTPATIENT)
Dept: GASTROENTEROLOGY | Facility: CLINIC | Age: 49
End: 2024-06-14

## 2024-06-14 NOTE — TELEPHONE ENCOUNTER
Procedure confirmed  Colonoscopy/Endoscopy     Via: Voice mail    Instructions given: Given to Patient at Visit     Prep Given: Golytely    Call the office if there are any questions.

## 2024-06-17 ENCOUNTER — TELEPHONE (OUTPATIENT)
Age: 49
End: 2024-06-17

## 2024-06-17 NOTE — TELEPHONE ENCOUNTER
We can cancel her upcoming infusion appointment. She already had 4 doses of b12 as ordered by Dr. Rainey. Thanks!

## 2024-06-18 ENCOUNTER — HOSPITAL ENCOUNTER (OUTPATIENT)
Dept: INFUSION CENTER | Facility: HOSPITAL | Age: 49
End: 2024-06-18
Attending: INTERNAL MEDICINE

## 2024-06-25 RX ORDER — SODIUM CHLORIDE 9 MG/ML
100 INJECTION, SOLUTION INTRAVENOUS CONTINUOUS
Status: CANCELLED | OUTPATIENT
Start: 2024-06-25

## 2024-06-26 ENCOUNTER — ANESTHESIA EVENT (OUTPATIENT)
Dept: GASTROENTEROLOGY | Facility: HOSPITAL | Age: 49
End: 2024-06-26

## 2024-06-26 ENCOUNTER — HOSPITAL ENCOUNTER (OUTPATIENT)
Dept: GASTROENTEROLOGY | Facility: HOSPITAL | Age: 49
Setting detail: OUTPATIENT SURGERY
Discharge: HOME | End: 2024-06-26
Payer: COMMERCIAL

## 2024-06-26 ENCOUNTER — ANESTHESIA (OUTPATIENT)
Dept: GASTROENTEROLOGY | Facility: HOSPITAL | Age: 49
End: 2024-06-26

## 2024-06-26 VITALS
DIASTOLIC BLOOD PRESSURE: 61 MMHG | SYSTOLIC BLOOD PRESSURE: 104 MMHG | OXYGEN SATURATION: 100 % | HEIGHT: 64 IN | HEART RATE: 58 BPM | TEMPERATURE: 98.7 F | BODY MASS INDEX: 33.63 KG/M2 | RESPIRATION RATE: 18 BRPM | WEIGHT: 197 LBS

## 2024-06-26 DIAGNOSIS — K63.5 POLYP OF COLON, UNSPECIFIED PART OF COLON, UNSPECIFIED TYPE: ICD-10-CM

## 2024-06-26 DIAGNOSIS — Z90.3 S/P GASTRECTOMY: ICD-10-CM

## 2024-06-26 DIAGNOSIS — D64.9 CHRONIC ANEMIA: ICD-10-CM

## 2024-06-26 PROBLEM — Z90.5 SINGLE KIDNEY: Status: ACTIVE | Noted: 2024-06-26

## 2024-06-26 PROCEDURE — 88305 TISSUE EXAM BY PATHOLOGIST: CPT | Performed by: PATHOLOGY

## 2024-06-26 PROCEDURE — 43239 EGD BIOPSY SINGLE/MULTIPLE: CPT | Performed by: STUDENT IN AN ORGANIZED HEALTH CARE EDUCATION/TRAINING PROGRAM

## 2024-06-26 PROCEDURE — 45378 DIAGNOSTIC COLONOSCOPY: CPT | Performed by: STUDENT IN AN ORGANIZED HEALTH CARE EDUCATION/TRAINING PROGRAM

## 2024-06-26 RX ORDER — SODIUM CHLORIDE 9 MG/ML
100 INJECTION, SOLUTION INTRAVENOUS CONTINUOUS
Status: DISCONTINUED | OUTPATIENT
Start: 2024-06-26 | End: 2024-06-30 | Stop reason: HOSPADM

## 2024-06-26 RX ORDER — PROPOFOL 10 MG/ML
INJECTION, EMULSION INTRAVENOUS AS NEEDED
Status: DISCONTINUED | OUTPATIENT
Start: 2024-06-26 | End: 2024-06-26

## 2024-06-26 RX ADMIN — PROPOFOL 50 MG: 10 INJECTION, EMULSION INTRAVENOUS at 10:38

## 2024-06-26 RX ADMIN — PROPOFOL 50 MG: 10 INJECTION, EMULSION INTRAVENOUS at 10:47

## 2024-06-26 RX ADMIN — PROPOFOL 130 MG: 10 INJECTION, EMULSION INTRAVENOUS at 10:31

## 2024-06-26 RX ADMIN — PROPOFOL 50 MG: 10 INJECTION, EMULSION INTRAVENOUS at 10:43

## 2024-06-26 RX ADMIN — PROPOFOL 50 MG: 10 INJECTION, EMULSION INTRAVENOUS at 10:40

## 2024-06-26 RX ADMIN — PROPOFOL 50 MG: 10 INJECTION, EMULSION INTRAVENOUS at 10:33

## 2024-06-26 RX ADMIN — SODIUM CHLORIDE: 0.9 INJECTION, SOLUTION INTRAVENOUS at 10:09

## 2024-06-26 NOTE — H&P
History and Physical - [facility]  Debbie Hendricks 49 y.o. female MRN: [id number]    HPI: Debbie Hendricks is a 49 y.o. female who presents for EGD/colonoscopy.    REVIEW OF SYSTEMS: Per the HPI, and otherwise unremarkable.    Historical Information   Past Medical History:   Diagnosis Date    Anemia, iron deficiency     Hyperlipemia     Obesity, Class II, BMI 35-39.9     Renal carcinoma, left (HCC) 2014    Seasonal allergies      Past Surgical History:   Procedure Laterality Date    APPENDECTOMY  1985    CHOLECYSTECTOMY  2017    GASTRIC BYPASS  2016    HYSTERECTOMY  2017    NEPHRECTOMY Left 2014    renal carcinoma    TUBAL LIGATION  1998     Social History   Social History     Substance and Sexual Activity   Alcohol Use None     Social History     Substance and Sexual Activity   Drug Use Not on file     Social History     Tobacco Use   Smoking Status Former    Current packs/day: 0.00    Types: Cigarettes    Quit date: 2003    Years since quittin.1   Smokeless Tobacco Never     Family History   Problem Relation Age of Onset    Hyperlipidemia Mother     Heart disease Mother     Cancer Father         stomach & ? bladder    Diabetes Father     Hypertension Father     Hyperlipidemia Father     Stroke Father     Prostate cancer Father     Lung cancer Maternal Grandmother     No Known Problems Maternal Grandfather     No Known Problems Paternal Grandmother     No Known Problems Paternal Grandfather     No Known Problems Maternal Aunt     No Known Problems Paternal Aunt     No Known Problems Paternal Aunt     Breast cancer Neg Hx        Meds/Allergies       Current Outpatient Medications:     cholecalciferol (VITAMIN D3) 400 units tablet    fexofenadine (ALLEGRA) 180 MG tablet    fluticasone (FLONASE) 50 mcg/act nasal spray    estradiol valerate (DELESTROGEN) 20 MG/ML injection    montelukast (SINGULAIR) 10 mg tablet    olopatadine (PATANOL) 0.1 % ophthalmic solution    polyethylene glycol  "(GOLYTELY) 4000 mL solution    Progesterone 100 MG CAPS    testosterone cypionate (DEPO-TESTOSTERONE) 200 mg/mL SOLN    Current Facility-Administered Medications:     sodium chloride 0.9 % infusion, 100 mL/hr, Intravenous, Continuous, New Bag at 06/26/24 1009    Allergies   Allergen Reactions    [place] Oil - Food Allergy Other (See Comments)       Objective     /74   Pulse 68   Temp 98.7 °F (37.1 °C) (Temporal)   Resp 17   Ht 5' 4\" (1.626 m)   Wt 89.4 kg (197 lb)   SpO2 99%   BMI 33.81 kg/m²     PHYSICAL EXAM    General Appearance: NAD, cooperative, alert  Eyes: Anicteric  GI:  Soft, non-tender, non-distended; normal bowel sounds; no masses, no organomegaly   Rectal: Deferred until procedure  Musculoskeletal: No edema.  Skin:  No jaundice    ASSESSMENT/PLAN:  This is a 49 y.o. year old female here for EGD/colonoscopy, and she is stable and optimized for her procedure.        "

## 2024-06-26 NOTE — ANESTHESIA PREPROCEDURE EVALUATION
Procedure:  COLONOSCOPY  EGD    Relevant Problems   ANESTHESIA (within normal limits)      CARDIO (within normal limits)      /RENAL   (+) Renal cell cancer (HCC)   (+) Single kidney      HEMATOLOGY   (+) Anemia   (+) Iron deficiency anemia secondary to inadequate dietary iron intake      PULMONARY   (+) Childhood asthma   (-) Sleep apnea   (-) Smoking   (-) URI (upper respiratory infection)      Ear/Nose/Throat   (+) Seasonal allergic rhinitis due to pollen      Obstetrics/Gynecology   (+) H/O: hysterectomy      Surgery/Wound/Pain   (+) H/O gastric sleeve    BMI 33    Physical Exam    Airway    Mallampati score: I  TM Distance: >3 FB  Neck ROM: full     Dental   No notable dental hx     Cardiovascular      Pulmonary      Other Findings  post-pubertal.    Lab Results   Component Value Date    WBC 5.24 04/27/2024    HGB 10.9 (L) 04/27/2024     (H) 04/27/2024     Lab Results   Component Value Date    SODIUM 140 04/27/2024    K 3.7 04/27/2024    BUN 13 04/27/2024    CREATININE 0.73 04/27/2024    EGFR 97 04/27/2024     Anesthesia Plan  ASA Score- 2     Anesthesia Type- IV sedation with anesthesia with ASA Monitors.         Additional Monitors:     Airway Plan:            Plan Factors-Exercise tolerance (METS): >4 METS.    Chart reviewed.   Existing labs reviewed. Patient summary reviewed.    Patient is not a current smoker.              Induction- intravenous.    Postoperative Plan-         Informed Consent- Anesthetic plan and risks discussed with patient and spouse.  I personally reviewed this patient with the CRNA. Discussed and agreed on the Anesthesia Plan with the CRNA..

## 2024-06-26 NOTE — ANESTHESIA POSTPROCEDURE EVALUATION
Post-Op Assessment Note    CV Status:  Stable  Pain Score: 0    Pain management: adequate       Mental Status:  Alert and awake   Hydration Status:  Euvolemic   PONV Controlled:  Controlled   Airway Patency:  Patent     Post Op Vitals Reviewed: Yes    No anethesia notable event occurred.    Staff: Anesthesiologist, CRNA               BP   97/54   Temp      Pulse  65   Resp   18   SpO2   100

## 2024-06-28 PROCEDURE — 88305 TISSUE EXAM BY PATHOLOGIST: CPT | Performed by: PATHOLOGY

## 2024-07-15 ENCOUNTER — HOSPITAL ENCOUNTER (OUTPATIENT)
Dept: BONE DENSITY | Facility: MEDICAL CENTER | Age: 49
Discharge: HOME | End: 2024-07-15
Payer: COMMERCIAL

## 2024-07-15 DIAGNOSIS — Z78.0 POSTMENOPAUSAL ESTROGEN DEFICIENCY: ICD-10-CM

## 2024-07-15 PROCEDURE — 77080 DXA BONE DENSITY AXIAL: CPT

## 2024-07-19 ENCOUNTER — TELEPHONE (OUTPATIENT)
Dept: FAMILY MEDICINE CLINIC | Facility: CLINIC | Age: 49
End: 2024-07-19

## 2024-07-19 NOTE — TELEPHONE ENCOUNTER
----- Message from Jhonathan Gorman MD sent at 7/18/2024  5:04 PM EDT -----  DEXA was normal.  Based on FRAX score, no treatment is needed.  Repeat in 2 years is recommended.

## 2024-07-26 ENCOUNTER — TELEPHONE (OUTPATIENT)
Age: 49
End: 2024-07-26

## 2024-07-26 NOTE — TELEPHONE ENCOUNTER
Left message for patient to return call to the office regarding appointment 8/8/24 at 9am with Dr Rainey. Appointment was rescheduled to 8/12/24 10:20am with Frances Muller due to Dr Rainey's change in schedule.  Provided call back number [phone]

## 2024-08-09 ENCOUNTER — TELEPHONE (OUTPATIENT)
Age: 49
End: 2024-08-09

## 2024-08-16 ENCOUNTER — TELEPHONE (OUTPATIENT)
Age: 49
End: 2024-08-16

## 2024-08-16 NOTE — TELEPHONE ENCOUNTER
Left message reminding patient to have labs completed for upcoming appointment 8/20/24. Provided call back number for any questions/concerns [phone]

## 2024-09-20 ENCOUNTER — OFFICE VISIT (OUTPATIENT)
Dept: FAMILY MEDICINE CLINIC | Facility: CLINIC | Age: 49
End: 2024-09-20
Payer: COMMERCIAL

## 2024-09-20 VITALS
WEIGHT: 201 LBS | TEMPERATURE: 97.6 F | OXYGEN SATURATION: 96 % | SYSTOLIC BLOOD PRESSURE: 100 MMHG | HEART RATE: 66 BPM | DIASTOLIC BLOOD PRESSURE: 68 MMHG | BODY MASS INDEX: 34.31 KG/M2 | HEIGHT: 64 IN

## 2024-09-20 DIAGNOSIS — H66.91 RIGHT OTITIS MEDIA, UNSPECIFIED OTITIS MEDIA TYPE: ICD-10-CM

## 2024-09-20 DIAGNOSIS — J01.00 ACUTE NON-RECURRENT MAXILLARY SINUSITIS: ICD-10-CM

## 2024-09-20 DIAGNOSIS — J02.9 PHARYNGITIS, UNSPECIFIED ETIOLOGY: ICD-10-CM

## 2024-09-20 DIAGNOSIS — M25.512 ACUTE PAIN OF LEFT SHOULDER: Primary | ICD-10-CM

## 2024-09-20 LAB — S PYO AG THROAT QL: NEGATIVE

## 2024-09-20 PROCEDURE — 87880 STREP A ASSAY W/OPTIC: CPT | Performed by: PHYSICIAN ASSISTANT

## 2024-09-20 PROCEDURE — 99214 OFFICE O/P EST MOD 30 MIN: CPT | Performed by: PHYSICIAN ASSISTANT

## 2024-09-20 RX ORDER — PREDNISONE 10 MG/1
TABLET ORAL
Qty: 30 TABLET | Refills: 0 | Status: SHIPPED | OUTPATIENT
Start: 2024-09-20 | End: 2024-09-30

## 2024-09-20 RX ORDER — CEFUROXIME AXETIL 500 MG/1
500 TABLET ORAL EVERY 12 HOURS SCHEDULED
Qty: 20 TABLET | Refills: 0 | Status: SHIPPED | OUTPATIENT
Start: 2024-09-20 | End: 2024-09-30

## 2024-09-20 NOTE — PROGRESS NOTES
Ambulatory Visit  Name: Debbie Hendricks      : 1975      MRN: [id number]  Encounter Provider: Broderick Chavez PA-C  Encounter Date: 2024   Encounter department: [facility]  Patient Instructions     Assessment/plan:  1.  Right shoulder pain-pain is primarily at the anterior joint space, possibly burst Palade's versus impingement-would recommend trial of prednisone 10 mg, 5 tablets daily for 2 days, then 4 tablets daily for 2 days, then 3 tablets daily for 2 days, then 2 tablets daily for 2 days, then 1 tablet daily for 2 days.  Thirty pills with no refills.  Patient may also use Tylenol over-the-counter and Voltaren gel as necessary.  Follow-up in the next 1 to 2 weeks if not better.  Consider imaging and further evaluation by physical therapy.  2.  Pharyngitis-rapid strep test was negative.  Symptoms likely related to acute sinusitis  3.  Acute sinusitis-patient with right-sided maxillary pressure, recommend starting Ceftin 500 mg twice daily for 10 days.  4.  Right otitis media-recommend Ceftin as above.  Prednisone should also help improve symptoms quickly.    Assessment & Plan  Acute pain of left shoulder    Orders:    predniSONE 10 mg tablet; 5,5,4,4,3,3,2,2,1,1    Pharyngitis, unspecified etiology    Orders:    POCT rapid ANTIGEN strepA    Acute non-recurrent maxillary sinusitis    Orders:    cefuroxime (CEFTIN) 500 mg tablet; Take 1 tablet (500 mg total) by mouth every 12 (twelve) hours for 10 days    Right otitis media, unspecified otitis media type    Orders:    cefuroxime (CEFTIN) 500 mg tablet; Take 1 tablet (500 mg total) by mouth every 12 (twelve) hours for 10 days       History of Present Illness     HPI: This is a 49-year-old female who presents to the office with concerns of her right shoulder pain that started earlier this week.  Pain is mostly at the anterior joint space.  It does bother her with certain movements.  Shortly after this pain began she had some pain  "in the right throat and ear area and thought it might be related but was not sure.  She continues to have some sinus pressure and increased drainage.  She does have underlying allergies and has been off of her Singulair medication for about 3 weeks.  She has been feeling a little bit warm but not necessarily having a lot of fever.  She has not had relief with over-the-counter medications thus far.          Review of Systems   Constitutional:  Positive for fatigue. Negative for chills and fever.   HENT:  Positive for congestion, ear pain, postnasal drip, rhinorrhea, sinus pressure and sinus pain.    Eyes:  Negative for visual disturbance.   Respiratory:  Negative for cough, chest tightness and shortness of breath.    Cardiovascular:  Negative for chest pain and palpitations.   Gastrointestinal:  Negative for diarrhea, nausea and vomiting.   Endocrine: Negative for polyuria.   Genitourinary:  Negative for dysuria and frequency.   Musculoskeletal:  Positive for arthralgias and myalgias.   Skin:  Negative for pallor and rash.   Neurological:  Negative for dizziness, weakness, light-headedness, numbness and headaches.   Psychiatric/Behavioral:  Negative for agitation, behavioral problems and sleep disturbance.    All other systems reviewed and are negative.          Objective     /68 (BP Location: Left arm, Patient Position: Sitting, Cuff Size: Standard)   Pulse 66   Temp 97.6 °F (36.4 °C) (Tympanic)   Ht 5' 4\" (1.626 m)   Wt 91.2 kg (201 lb)   SpO2 96%   BMI 34.50 kg/m²     Physical Exam  Vitals and nursing note reviewed.   Constitutional:       General: She is not in acute distress.     Appearance: She is well-developed.   HENT:      Head: Normocephalic and atraumatic.      Left Ear: Tympanic membrane normal.      Ears:      Comments: Right tympanic membrane is erythematous and dull.     Nose:      Comments: Right turbinates are erythematous and edematous with small amount of bleeding.     Mouth/Throat:     "  Pharynx: No oropharyngeal exudate.   Eyes:      General:         Right eye: No discharge.         Left eye: No discharge.      Pupils: Pupils are equal, round, and reactive to light.   Cardiovascular:      Rate and Rhythm: Normal rate and regular rhythm.      Heart sounds: Normal heart sounds. No murmur heard.     No friction rub.   Pulmonary:      Effort: Pulmonary effort is normal. No respiratory distress.      Breath sounds: Normal breath sounds. No wheezing or rales.   Musculoskeletal:         General: Normal range of motion.      Cervical back: Neck supple.      Comments: Patient has full range of motion with the right shoulder.  She does have some pain with internal rotation.  She is able to abduct greater than 90 degrees.  She does have some point tenderness at the anterior joint space.   Lymphadenopathy:      Cervical: Cervical adenopathy present.   Skin:     General: Skin is warm and dry.      Findings: No erythema.   Neurological:      Mental Status: She is alert and oriented to person, place, and time.   Psychiatric:         Behavior: Behavior normal.

## 2024-09-20 NOTE — PATIENT INSTRUCTIONS
Assessment/plan:  1.  Right shoulder pain-pain is primarily at the anterior joint space, possibly burst Palade's versus impingement-would recommend trial of prednisone 10 mg, 5 tablets daily for 2 days, then 4 tablets daily for 2 days, then 3 tablets daily for 2 days, then 2 tablets daily for 2 days, then 1 tablet daily for 2 days.  Thirty pills with no refills.  Patient may also use Tylenol over-the-counter and Voltaren gel as necessary.  Follow-up in the next 1 to 2 weeks if not better.  Consider imaging and further evaluation by physical therapy.  2.  Pharyngitis-rapid strep test was negative.  Symptoms likely related to acute sinusitis  3.  Acute sinusitis-patient with right-sided maxillary pressure, recommend starting Ceftin 500 mg twice daily for 10 days.  4.  Right otitis media-recommend Ceftin as above.  Prednisone should also help improve symptoms quickly.

## 2024-10-03 ENCOUNTER — OFFICE VISIT (OUTPATIENT)
Dept: GASTROENTEROLOGY | Facility: CLINIC | Age: 49
End: 2024-10-03
Payer: COMMERCIAL

## 2024-10-03 VITALS
DIASTOLIC BLOOD PRESSURE: 60 MMHG | SYSTOLIC BLOOD PRESSURE: 118 MMHG | HEIGHT: 64 IN | WEIGHT: 202 LBS | TEMPERATURE: 98.5 F | BODY MASS INDEX: 34.49 KG/M2

## 2024-10-03 DIAGNOSIS — K21.9 GASTROESOPHAGEAL REFLUX DISEASE, UNSPECIFIED WHETHER ESOPHAGITIS PRESENT: ICD-10-CM

## 2024-10-03 DIAGNOSIS — Z90.3 S/P GASTRECTOMY: ICD-10-CM

## 2024-10-03 DIAGNOSIS — D64.9 CHRONIC ANEMIA: Primary | ICD-10-CM

## 2024-10-03 PROCEDURE — 99214 OFFICE O/P EST MOD 30 MIN: CPT | Performed by: PHYSICIAN ASSISTANT

## 2024-10-03 NOTE — PROGRESS NOTES
Ambulatory Visit  Name: Debbie Hendricks      : 1975      MRN: [id number]  Encounter Provider: Maria R Ramirez PA-C  Encounter Date: 10/3/2024   Encounter department: [facility] [place]    Assessment & Plan  Chronic anemia  Patient is undergoing IV Venofer and IM B12 injections per hematology.  She said she missed her follow-up appointment with them in August but is going to reschedule.  EGD and colonoscopy did not depict any etiology of anemia.  -repeat cbc and iron  -If anemia persists, she should undergo capsule study  -Follow-up with hematology  Orders:    Iron Panel (Includes Ferritin, Iron Sat%, Iron, and TIBC); Future    CBC and differential; Future      Gastroesophageal reflux disease, unspecified whether esophagitis present  Patient says she gets reflux about once or twice a week.  She does not want to be on medications for this at this time.  -Explained that as she is status post gastrectomy,  we would like to repeat EGD in another 3 years however if her symptoms worsen and she starts to feel reflux more than twice a week she should let us know so she can be started on medication       S/P gastrectomy  See above.          History of Present Illness     Debbie Hendricks is a 49 y.o. female who presents for follow-up.  Patient says she gets reflux about once or twice a week.  She does not want to be on medications for this at this time.  She denies nausea vomiting, trouble swallowing, abdominal pain, fevers, chills, unintentional weight loss, constipation, diarrhea, bloody or black stools.      Review of Systems   Constitutional:  Negative for chills and fever.   HENT:  Negative for ear pain and sore throat.    Eyes:  Negative for pain and visual disturbance.   Respiratory:  Negative for cough and shortness of breath.    Cardiovascular:  Negative for chest pain and palpitations.   Gastrointestinal:  Negative for abdominal pain and vomiting.   Genitourinary:   "Negative for dysuria and hematuria.   Musculoskeletal:  Negative for arthralgias and back pain.   Skin:  Negative for color change and rash.   Neurological:  Negative for seizures and syncope.   All other systems reviewed and are negative.          Objective     /60 (BP Location: Right arm, Patient Position: Sitting, Cuff Size: Standard)   Temp 98.5 °F (36.9 °C) (Tympanic)   Ht 5' 4\" (1.626 m)   Wt 91.6 kg (202 lb)   BMI 34.67 kg/m²     Physical Exam  Vitals and nursing note reviewed.   Constitutional:       General: She is not in acute distress.     Appearance: She is well-developed.   HENT:      Head: Normocephalic and atraumatic.   Eyes:      Conjunctiva/sclera: Conjunctivae normal.   Cardiovascular:      Rate and Rhythm: Normal rate and regular rhythm.      Heart sounds: No murmur heard.  Pulmonary:      Effort: Pulmonary effort is normal. No respiratory distress.      Breath sounds: Normal breath sounds.   Abdominal:      Palpations: Abdomen is soft.      Tenderness: There is no abdominal tenderness.   Musculoskeletal:         General: No swelling.      Cervical back: Neck supple.   Skin:     General: Skin is warm and dry.      Capillary Refill: Capillary refill takes less than 2 seconds.   Neurological:      Mental Status: She is alert.   Psychiatric:         Mood and Affect: Mood normal.         "

## 2024-10-18 ENCOUNTER — APPOINTMENT (EMERGENCY)
Dept: RADIOLOGY | Facility: HOSPITAL | Age: 49
End: 2024-10-18
Payer: COMMERCIAL

## 2024-10-18 ENCOUNTER — HOSPITAL ENCOUNTER (EMERGENCY)
Facility: HOSPITAL | Age: 49
Discharge: HOME | End: 2024-10-19
Attending: EMERGENCY MEDICINE | Admitting: EMERGENCY MEDICINE
Payer: COMMERCIAL

## 2024-10-18 VITALS
TEMPERATURE: 97.8 F | SYSTOLIC BLOOD PRESSURE: 133 MMHG | OXYGEN SATURATION: 98 % | RESPIRATION RATE: 14 BRPM | HEART RATE: 81 BPM | DIASTOLIC BLOOD PRESSURE: 87 MMHG
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
## 2024-10-18 LAB
ALBUMIN SERPL BCG-MCNC: 4.3 G/DL (ref 3.5–5)
ALP SERPL-CCNC: 37 U/L (ref 34–104)
ALT SERPL W P-5'-P-CCNC: 11 U/L (ref 7–52)
ANION GAP SERPL CALCULATED.3IONS-SCNC: 9 MMOL/L (ref 4–13)
AST SERPL W P-5'-P-CCNC: 14 U/L (ref 13–39)
BACTERIA UR QL AUTO: ABNORMAL /HPF
BASOPHILS # BLD AUTO: 0.07 THOUSANDS/ΜL (ref 0–0.1)
BASOPHILS NFR BLD AUTO: 1 % (ref 0–1)
BILIRUB SERPL-MCNC: 0.5 MG/DL (ref 0.2–1)
BILIRUB UR QL STRIP: ABNORMAL
BUN SERPL-MCNC: 16 MG/DL (ref 5–25)
CALCIUM SERPL-MCNC: 9.1 MG/DL (ref 8.4–10.2)
CHLORIDE SERPL-SCNC: 105 MMOL/L (ref 96–108)
CLARITY UR: CLEAR
CO2 SERPL-SCNC: 23 MMOL/L (ref 21–32)
COLOR UR: YELLOW
CREAT SERPL-MCNC: 0.75 MG/DL (ref 0.6–1.3)
EOSINOPHIL # BLD AUTO: 0.27 THOUSAND/ΜL (ref 0–0.61)
EOSINOPHIL NFR BLD AUTO: 3 % (ref 0–6)
ERYTHROCYTE [DISTWIDTH] IN BLOOD BY AUTOMATED COUNT: 13.4 % (ref 11.6–15.1)
EXT PREGNANCY TEST URINE: NEGATIVE
EXT. CONTROL: NORMAL
GFR SERPL CREATININE-BSD FRML MDRD: 93 ML/MIN/1.73SQ M
GLUCOSE SERPL-MCNC: 82 MG/DL (ref 65–140)
GLUCOSE UR STRIP-MCNC: NEGATIVE MG/DL
HCT VFR BLD AUTO: 40.2 % (ref 34.8–46.1)
HGB BLD-MCNC: 14.2 G/DL (ref 11.5–15.4)
HGB UR QL STRIP.AUTO: ABNORMAL
IMM GRANULOCYTES # BLD AUTO: 0.02 THOUSAND/UL (ref 0–0.2)
IMM GRANULOCYTES NFR BLD AUTO: 0 % (ref 0–2)
KETONES UR STRIP-MCNC: ABNORMAL MG/DL
LEUKOCYTE ESTERASE UR QL STRIP: NEGATIVE
LYMPHOCYTES # BLD AUTO: 2.72 THOUSANDS/ΜL (ref 0.6–4.47)
LYMPHOCYTES NFR BLD AUTO: 34 % (ref 14–44)
MCH RBC QN AUTO: 28.1 PG (ref 26.8–34.3)
MCHC RBC AUTO-ENTMCNC: 35.3 G/DL (ref 31.4–37.4)
MCV RBC AUTO: 80 FL (ref 82–98)
MONOCYTES # BLD AUTO: 0.62 THOUSAND/ΜL (ref 0.17–1.22)
MONOCYTES NFR BLD AUTO: 8 % (ref 4–12)
NEUTROPHILS # BLD AUTO: 4.38 THOUSANDS/ΜL (ref 1.85–7.62)
NEUTS SEG NFR BLD AUTO: 54 % (ref 43–75)
NITRITE UR QL STRIP: NEGATIVE
NON-SQ EPI CELLS URNS QL MICRO: ABNORMAL /HPF
NRBC BLD AUTO-RTO: 0 /100 WBCS
PH UR STRIP.AUTO: 5.5 [PH] (ref 4.5–8)
PLATELET # BLD AUTO: 347 THOUSANDS/UL (ref 149–390)
PMV BLD AUTO: 9.5 FL (ref 8.9–12.7)
POTASSIUM SERPL-SCNC: 3.6 MMOL/L (ref 3.5–5.3)
PROT SERPL-MCNC: 7.6 G/DL (ref 6.4–8.4)
PROT UR STRIP-MCNC: NEGATIVE MG/DL
RBC # BLD AUTO: 5.05 MILLION/UL (ref 3.81–5.12)
RBC #/AREA URNS AUTO: ABNORMAL /HPF
SODIUM SERPL-SCNC: 137 MMOL/L (ref 135–147)
SP GR UR STRIP.AUTO: 1.02 (ref 1–1.03)
UROBILINOGEN UR QL STRIP.AUTO: 0.2 E.U./DL
WBC # BLD AUTO: 8.08 THOUSAND/UL (ref 4.31–10.16)
WBC #/AREA URNS AUTO: ABNORMAL /HPF

## 2024-10-18 PROCEDURE — 72125 CT NECK SPINE W/O DYE: CPT

## 2024-10-18 PROCEDURE — 80053 COMPREHEN METABOLIC PANEL: CPT | Performed by: EMERGENCY MEDICINE

## 2024-10-18 PROCEDURE — 36415 COLL VENOUS BLD VENIPUNCTURE: CPT | Performed by: EMERGENCY MEDICINE

## 2024-10-18 PROCEDURE — 81001 URINALYSIS AUTO W/SCOPE: CPT

## 2024-10-18 PROCEDURE — 99285 EMERGENCY DEPT VISIT HI MDM: CPT | Performed by: EMERGENCY MEDICINE

## 2024-10-18 PROCEDURE — 99284 EMERGENCY DEPT VISIT MOD MDM: CPT

## 2024-10-18 PROCEDURE — 85025 COMPLETE CBC W/AUTO DIFF WBC: CPT | Performed by: EMERGENCY MEDICINE

## 2024-10-18 PROCEDURE — 70450 CT HEAD/BRAIN W/O DYE: CPT

## 2024-10-18 PROCEDURE — 81025 URINE PREGNANCY TEST: CPT | Performed by: EMERGENCY MEDICINE

## 2024-10-18 PROCEDURE — 74177 CT ABD & PELVIS W/CONTRAST: CPT

## 2024-10-18 RX ORDER — ACETAMINOPHEN 325 MG/1
975 TABLET ORAL ONCE
Status: COMPLETED | OUTPATIENT
Start: 2024-10-18 | End: 2024-10-18

## 2024-10-18 RX ADMIN — IOHEXOL 85 ML: 350 INJECTION, SOLUTION INTRAVENOUS at 23:03

## 2024-10-18 RX ADMIN — ACETAMINOPHEN 975 MG: 325 TABLET ORAL at 21:21
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
## 2024-11-06 ENCOUNTER — OFFICE VISIT (OUTPATIENT)
Dept: FAMILY MEDICINE CLINIC | Facility: CLINIC | Age: 49
End: 2024-11-06
Payer: COMMERCIAL

## 2024-11-06 VITALS
DIASTOLIC BLOOD PRESSURE: 82 MMHG | WEIGHT: 199 LBS | HEIGHT: 65 IN | TEMPERATURE: 97.6 F | SYSTOLIC BLOOD PRESSURE: 126 MMHG | BODY MASS INDEX: 33.15 KG/M2 | OXYGEN SATURATION: 99 % | HEART RATE: 57 BPM

## 2024-11-06 DIAGNOSIS — J01.00 ACUTE NON-RECURRENT MAXILLARY SINUSITIS: Primary | ICD-10-CM

## 2024-11-06 PROCEDURE — 99214 OFFICE O/P EST MOD 30 MIN: CPT | Performed by: PHYSICIAN ASSISTANT

## 2024-11-06 RX ORDER — SULFAMETHOXAZOLE AND TRIMETHOPRIM 800; 160 MG/1; MG/1
1 TABLET ORAL EVERY 12 HOURS SCHEDULED
Qty: 20 TABLET | Refills: 0 | Status: SHIPPED | OUTPATIENT
Start: 2024-11-06 | End: 2024-11-16

## 2024-11-06 NOTE — PROGRESS NOTES
Ambulatory Visit  Name: Debbie Hendricks      : 1975      MRN: [id number]  Encounter Provider: Broderick Chavez PA-C  Encounter Date: 2024   Encounter department: [facility] PRIMARY CARE  Patient Instructions       Assessment/plan:  1.  Acute sinusitis-patient will be started on Bactrim DS, 1 tablet twice daily for 10 days with food.  She is encouraged to use Mucinex and Flonase over-the-counter for symptoms as necessary.  2.  Chronic daily headaches-recommend assessing complete labs once patient is off of antibiotic therapies.  Recommend scheduling follow-up afterwards to review treatment options if necessary or further evaluation.  3.  Seasonal allergic rhinitis-presently stable with Allegra, Flonase, Singulair, no medication changes.    Assessment & Plan  Acute non-recurrent maxillary sinusitis    Orders:    sulfamethoxazole-trimethoprim (BACTRIM DS) 800-160 mg per tablet; Take 1 tablet by mouth every 12 (twelve) hours for 10 days    CBC and differential    Ferritin    Vitamin B12; Future    Comprehensive metabolic panel    TSH, 3rd generation with Free T4 reflex    Vitamin D 25 hydroxy    Lyme Total AB W Reflex to IGM/IGG; Future    Sedimentation rate, automated; Future       History of Present Illness     HPI: This is a 49-year-old female that presents to the office with concerns over sinus congestion, head pressure, postnasal drip which has been going on for a few days.  She has not had relief with over-the-counter medications.  She has not had any significant cough or chest congestion but she is feeling very fatigued and rundown.  She has not had any fevers that she is aware of.  She has not had any sick contacts with COVID.  Patient's boyfriend also mentions that she has been struggling with chronic daily headaches for some time before this infection.  She is interested in some further evaluation and treatment for this.          Review of Systems   Constitutional:  Positive for activity  "change and fatigue. Negative for fever.   HENT:  Positive for congestion, postnasal drip, rhinorrhea, sinus pressure and sore throat. Negative for ear pain.    Respiratory:  Positive for cough. Negative for chest tightness, shortness of breath and wheezing.    Cardiovascular:  Negative for palpitations.   Gastrointestinal:  Negative for diarrhea and nausea.   Musculoskeletal:  Positive for myalgias. Negative for arthralgias.   Skin:  Negative for rash.   Neurological:  Negative for dizziness and numbness.   All other systems reviewed and are negative.          Objective     /82 (BP Location: Right arm, Patient Position: Sitting, Cuff Size: Adult)   Pulse 57   Temp 97.6 °F (36.4 °C)   Ht 5' 4.8\" (1.646 m)   Wt 90.3 kg (199 lb)   SpO2 99%   BMI 33.32 kg/m²     Physical Exam  Vitals and nursing note reviewed.   Constitutional:       General: She is not in acute distress.     Appearance: She is well-developed.   HENT:      Head: Normocephalic and atraumatic.      Mouth/Throat:      Pharynx: No oropharyngeal exudate.   Eyes:      General:         Right eye: No discharge.         Left eye: No discharge.      Pupils: Pupils are equal, round, and reactive to light.   Cardiovascular:      Rate and Rhythm: Normal rate and regular rhythm.      Heart sounds: Normal heart sounds. No murmur heard.     No friction rub.   Pulmonary:      Effort: Pulmonary effort is normal. No respiratory distress.      Breath sounds: Normal breath sounds. No wheezing or rales.   Musculoskeletal:         General: Normal range of motion.      Cervical back: Neck supple.   Lymphadenopathy:      Cervical: Cervical adenopathy present.   Skin:     General: Skin is warm and dry.      Findings: No erythema.   Neurological:      Mental Status: She is alert and oriented to person, place, and time.   Psychiatric:         Behavior: Behavior normal.         "

## 2024-11-06 NOTE — PATIENT INSTRUCTIONS
Assessment/plan:  1.  Acute sinusitis-patient will be started on Bactrim DS, 1 tablet twice daily for 10 days with food.  She is encouraged to use Mucinex and Flonase over-the-counter for symptoms as necessary.  2.  Chronic daily headaches-recommend assessing complete labs once patient is off of antibiotic therapies.  Recommend scheduling follow-up afterwards to review treatment options if necessary or further evaluation.  3.  Seasonal allergic rhinitis-presently stable with Allegra, Flonase, Singulair, no medication changes.

## 2025-01-13 ENCOUNTER — DOCUMENTATION (OUTPATIENT)
Dept: GENETICS | Facility: CLINIC | Age: 50
End: 2025-01-13

## 2025-01-16 ENCOUNTER — CLINICAL SUPPORT (OUTPATIENT)
Dept: GENETICS | Facility: CLINIC | Age: 50
End: 2025-01-16

## 2025-01-16 DIAGNOSIS — Z80.0 FAMILY HISTORY OF STOMACH CANCER: ICD-10-CM

## 2025-01-16 DIAGNOSIS — C64.9 RENAL CELL CARCINOMA, UNSPECIFIED LATERALITY (HCC): Primary | ICD-10-CM

## 2025-01-16 NOTE — PROGRESS NOTES
Pre-Test Genetic Counseling Consult Note    Patient Name: Debbie Hendricks   /Age: 1975/49 y.o.  Referring Provider: Emili Rainey MD     Date of Service: 2025  Genetic Counselor: Kristy Cormier MS, [facility]  Genetic Counseling Student: Samuel Richter graduate genetic counseling student completed today's appointment under my supervision   Interpretation Services: None  Location: In-person consult at [place]  Length of Visit: 60 Minutes    Debbie was referred to the [facility] Risk and Genetic Assessment Program due to her personal history of renal cancer and her family history of stomach and bladder cancer . she presents today to discuss the possibility of a hereditary cancer syndrome, options for genetic testing, and implications for her and her family.     Cancer History and Treatment:     Personal History: History of renal cell carcinoma s/p left nephrectomy in ; pathology records were not available during our appointment      Screening Hx:     Breast:  Breast Imaging: Annual mammogram; most recent 24, Repeat mammogram in 1 year, due: 2025  Breast Density: Scattered fibroglandular density     Colon:  Colonoscopy: Most recent 24; Repeat colonoscopy in 5 years, due: 2029     Gynecologic:  Ovaries intact CORKY 2017 age 42  due to multiple, large fibroids    Skin:  Dermatology last seen in     Reproductive History  Age at menarche: 11  Age at first live birth:  20  Menopause: Post Menopausal ()  Hormone replacement: estrogen injects, oral progesterone, oral testosterone. Last DEXA bone scan normal    Medical and Surgical History  Pertinent surgical history:   Past Surgical History:   Procedure Laterality Date    APPENDECTOMY  1985    CHOLECYSTECTOMY  2017    COLONOSCOPY      GASTRIC BYPASS  2016    HYSTERECTOMY  2017    NEPHRECTOMY Left 2014    renal carcinoma    TUBAL LIGATION        Pertinent medical history:  Past Medical History:   Diagnosis Date     "Anemia, iron deficiency     Hyperlipemia     Obesity, Class II, BMI 35-39.9     Renal carcinoma, left (HCC) 2014    Seasonal allergies        Other History:  Height:   Ht Readings from Last 1 Encounters:   11/06/24 5' 4.8\" (1.646 m)     Weight:   Wt Readings from Last 1 Encounters:   11/06/24 90.3 kg (199 lb)     Relevant Family History   Patient reports paternal Ashkenazi Yazidi ancestry.     The patient denies personal and family history of shagreen patches, renal cysts, and facial fibrofolliculomas.    Maternal Family History:  The patient's mother has history of a total hysterectomy for unknown reason, abnormal mammogram, and tinnitus. The patient's maternal grandmother was diagnosed with gastric cancer when she was 74, however due to metastasis it is unknown if this is the primary. The patient's maternal great aunt has a history of lung cysts.    Paternal Family History:  The patient's father has a history of prostate removal at 68, bladder cancer diagnosed at 68, and stomach cancer diagnosed at 74. He also has a history of colon polyps (unknown number), tinnitus, and HTN. He was reportedly a smoker. The patient's paternal grandmother has a history of tinnitus. Her paternal great grandmother and great aunt were diagnosed with liver cancer.    Please refer to the scanned pedigree in the Media Tab for a complete family history     *All history is reported as provided by the patient; records are not available for review, except where indicated.     Assessment:  We discussed sporadic, familial and hereditary cancer.  We also discussed the many factors that influence our risk for cancer such as age, environmental exposures, lifestyle choices and family history.      We reviewed the indications suggestive of a hereditary predisposition to cancer.    Genetic testing is indicated for Debbie based on the following criteria: Meets NCCN V2.2025 Testing Criteria for further genetic risk evaluation for Hereditary RCC " syndromes: personal history of RCC <46 y    Given the patient's personal and family history, we discussed FH and FCLN. FH is a cancer predisposition syndrome that is associated with aggressive, young renal cell carcinoma and fibroids. FCLN is a cancer predisposition syndrome associated with Mac Hallie Mayur that has associations with renal cell carcinoma and pulmonary cysts and spontaneous pneumothorax. Given the Debbie's father's cancer history, we also discussed Platt syndrome.    The risks, benefits, and limitations of genetic testing were reviewed with the patient, as well as genetic discrimination laws, and possible test results (positive, negative, variants of uncertain significance) and their clinical implications. If positive for a mutation, options for managing cancer risk including increased surveillance, chemoprevention, and in some cases prophylactic surgery were discussed. Debbie was informed that if a hereditary cancer syndrome was identified in her, first degree relatives (parents, siblings, and children) have a chance of also inheriting the condition. Genetic testing would allow for predictive genetic testing in other relatives, who may also be at risk depending on their degree of relation.     Billing:  Most individuals pay <$100 for hereditary cancer genetic testing. If insurance covers the cost of the testing, individuals may still pay out of pocket secondary to co-pays, co-insurance, or deductibles. If the cost of the testing exceeds $100, the lab will reach out to the patient via phone or e-mail. The patient will then have the option to proceed with the testing, cancel the testing, or elect the self-pay option of $250. Debbie verbalized understanding.     Plan: Patient decided not to proceed with testing at this time. She will follow up with our office at a later date.    Testing Discussed Today: CustomNext: Cancer® +RNAinsight® (59 genes): APC, LULU, AXIN2, BAP1, BARD1, BMPR1A, BRCA1, BRCA2,  BRIP1, CDH1, CDK4, CDKN1B, CDKN2A, CHEK2, CTNNA1, DICER1, EGLN1, EPCAM, FH, FLCN, GREM1, HOXB13, KIF1B, KIT, MAX, MEN1, MET, MITF, MLH1, MSH2, MSH3, MSH6, MUTYH, NF1, NTHL1, PALB2, PDGFRA PMS2, POLD1, POLE, POT1, PTEN, RAD51C, RAD51D, RB1, RET, SDHA, SDHAF2, SDHB, SDHC, SDHD, SMAD4, SMARCA4, STK11, IYPV583, TP53, TSC1, TSC2, VHL      Summary:     Result Call Information:  In the event that we need to reach Debbie via telephone:  I confirmed the patient's mobile number on file as the best number to call with results  I confirmed with the patient that we can leave a voicemail on the provided numbers    Results take approximately 2-3 weeks to complete once test is started.    Debbie will be notified via [employer] once results are available.      Additional recommendations for surveillance/medical management will be made pending genetic test results.

## 2025-03-31 ENCOUNTER — APPOINTMENT (OUTPATIENT)
Dept: LAB | Facility: CLINIC | Age: 50
End: 2025-03-31
Payer: COMMERCIAL

## 2025-03-31 DIAGNOSIS — N91.1 SECONDARY PHYSIOLOGIC AMENORRHEA: ICD-10-CM

## 2025-03-31 DIAGNOSIS — D50.9 IRON DEFICIENCY ANEMIA, UNSPECIFIED IRON DEFICIENCY ANEMIA TYPE: ICD-10-CM

## 2025-03-31 DIAGNOSIS — E53.8 B12 DEFICIENCY: ICD-10-CM

## 2025-03-31 DIAGNOSIS — D64.9 CHRONIC ANEMIA: ICD-10-CM

## 2025-03-31 DIAGNOSIS — J01.00 ACUTE NON-RECURRENT MAXILLARY SINUSITIS: ICD-10-CM

## 2025-03-31 DIAGNOSIS — R53.83 OTHER FATIGUE: ICD-10-CM

## 2025-03-31 LAB
25(OH)D3 SERPL-MCNC: 25.1 NG/ML (ref 30–100)
ALBUMIN SERPL BCG-MCNC: 4.1 G/DL (ref 3.5–5)
ALP SERPL-CCNC: 36 U/L (ref 34–104)
ALT SERPL W P-5'-P-CCNC: 10 U/L (ref 7–52)
ANION GAP SERPL CALCULATED.3IONS-SCNC: 7 MMOL/L (ref 4–13)
AST SERPL W P-5'-P-CCNC: 13 U/L (ref 13–39)
BASOPHILS # BLD MANUAL: 0.06 THOUSAND/UL (ref 0–0.1)
BASOPHILS NFR MAR MANUAL: 1 % (ref 0–1)
BILIRUB DIRECT SERPL-MCNC: 0.1 MG/DL (ref 0–0.2)
BILIRUB SERPL-MCNC: 0.47 MG/DL (ref 0.2–1)
BUN SERPL-MCNC: 11 MG/DL (ref 5–25)
CALCIUM SERPL-MCNC: 9.1 MG/DL (ref 8.4–10.2)
CHLORIDE SERPL-SCNC: 105 MMOL/L (ref 96–108)
CO2 SERPL-SCNC: 25 MMOL/L (ref 21–32)
CREAT SERPL-MCNC: 0.69 MG/DL (ref 0.6–1.3)
EOSINOPHIL # BLD MANUAL: 0.12 THOUSAND/UL (ref 0–0.4)
EOSINOPHIL NFR BLD MANUAL: 2 % (ref 0–6)
ERYTHROCYTE [DISTWIDTH] IN BLOOD BY AUTOMATED COUNT: 13.9 % (ref 11.6–15.1)
ERYTHROCYTE [SEDIMENTATION RATE] IN BLOOD: 11 MM/HOUR (ref 0–19)
ESTRADIOL SERPL-MCNC: 115.9 PG/ML
FERRITIN SERPL-MCNC: 9 NG/ML (ref 11–307)
GFR SERPL CREATININE-BSD FRML MDRD: 102 ML/MIN/1.73SQ M
GLUCOSE P FAST SERPL-MCNC: 84 MG/DL (ref 65–99)
HCT VFR BLD AUTO: 42.9 % (ref 34.8–46.1)
HGB BLD-MCNC: 15.3 G/DL (ref 11.5–15.4)
IRON SATN MFR SERPL: 20 % (ref 15–50)
IRON SERPL-MCNC: 84 UG/DL (ref 50–212)
LH SERPL-ACNC: 8.8 MIU/ML
LYMPHOCYTES # BLD AUTO: 2.48 THOUSAND/UL (ref 0.6–4.47)
LYMPHOCYTES # BLD AUTO: 41 % (ref 14–44)
MCH RBC QN AUTO: 27.7 PG (ref 26.8–34.3)
MCHC RBC AUTO-ENTMCNC: 35.7 G/DL (ref 31.4–37.4)
MCV RBC AUTO: 78 FL (ref 82–98)
MONOCYTES # BLD AUTO: 0.24 THOUSAND/UL (ref 0–1.22)
MONOCYTES NFR BLD: 4 % (ref 4–12)
NEUTROPHILS # BLD MANUAL: 3.15 THOUSAND/UL (ref 1.85–7.62)
NEUTS SEG NFR BLD AUTO: 52 % (ref 43–75)
PLATELET # BLD AUTO: 376 THOUSANDS/UL (ref 149–390)
PLATELET BLD QL SMEAR: ADEQUATE
PMV BLD AUTO: 10.2 FL (ref 8.9–12.7)
POTASSIUM SERPL-SCNC: 3.9 MMOL/L (ref 3.5–5.3)
PROGEST SERPL-MCNC: 0.15 NG/ML
PROT SERPL-MCNC: 7.6 G/DL (ref 6.4–8.4)
RBC # BLD AUTO: 5.53 MILLION/UL (ref 3.81–5.12)
RBC MORPH BLD: NORMAL
SODIUM SERPL-SCNC: 137 MMOL/L (ref 135–147)
TIBC SERPL-MCNC: 420 UG/DL (ref 250–450)
TRANSFERRIN SERPL-MCNC: 300 MG/DL (ref 203–362)
TSH SERPL DL<=0.05 MIU/L-ACNC: 2.54 UIU/ML (ref 0.45–4.5)
UIBC SERPL-MCNC: 336 UG/DL (ref 155–355)
VIT B12 SERPL-MCNC: 143 PG/ML (ref 180–914)
WBC # BLD AUTO: 6.06 THOUSAND/UL (ref 4.31–10.16)

## 2025-03-31 PROCEDURE — 82728 ASSAY OF FERRITIN: CPT

## 2025-03-31 PROCEDURE — 83002 ASSAY OF GONADOTROPIN (LH): CPT

## 2025-03-31 PROCEDURE — 86618 LYME DISEASE ANTIBODY: CPT

## 2025-03-31 PROCEDURE — 83550 IRON BINDING TEST: CPT

## 2025-03-31 PROCEDURE — 84403 ASSAY OF TOTAL TESTOSTERONE: CPT

## 2025-03-31 PROCEDURE — 85652 RBC SED RATE AUTOMATED: CPT

## 2025-03-31 PROCEDURE — 82626 DEHYDROEPIANDROSTERONE: CPT

## 2025-03-31 PROCEDURE — 82248 BILIRUBIN DIRECT: CPT

## 2025-03-31 PROCEDURE — 82607 VITAMIN B-12: CPT

## 2025-03-31 PROCEDURE — 84144 ASSAY OF PROGESTERONE: CPT

## 2025-03-31 PROCEDURE — 82670 ASSAY OF TOTAL ESTRADIOL: CPT

## 2025-03-31 PROCEDURE — 82642 DIHYDROTESTOSTERONE: CPT

## 2025-03-31 PROCEDURE — 36415 COLL VENOUS BLD VENIPUNCTURE: CPT

## 2025-03-31 PROCEDURE — 84402 ASSAY OF FREE TESTOSTERONE: CPT

## 2025-03-31 PROCEDURE — 83540 ASSAY OF IRON: CPT

## 2025-04-01 ENCOUNTER — TELEPHONE (OUTPATIENT)
Age: 50
End: 2025-04-01

## 2025-04-01 ENCOUNTER — RESULTS FOLLOW-UP (OUTPATIENT)
Dept: FAMILY MEDICINE CLINIC | Facility: CLINIC | Age: 50
End: 2025-04-01

## 2025-04-01 LAB
B BURGDOR IGG+IGM SER QL IA: NEGATIVE
TESTOST FREE SERPL-MCNC: 0.9 PG/ML (ref 0–4.2)
TESTOST SERPL-MCNC: 13 NG/DL (ref 4–50)

## 2025-04-01 NOTE — TELEPHONE ENCOUNTER
Call pt LVM Vitamin D level is still low.  Would recommend doubling current intake, approximately 5000 to 10,000 units daily

## 2025-04-01 NOTE — TELEPHONE ENCOUNTER
----- Message from Broderick Chavez PA-C sent at 4/1/2025  7:41 AM EDT -----  Vitamin D level is still low.  Would recommend doubling current intake, approximately 5000 to 10,000 units daily.

## 2025-04-07 ENCOUNTER — OFFICE VISIT (OUTPATIENT)
Age: 50
End: 2025-04-07
Payer: COMMERCIAL

## 2025-04-07 ENCOUNTER — TELEPHONE (OUTPATIENT)
Age: 50
End: 2025-04-07

## 2025-04-07 VITALS
WEIGHT: 203 LBS | DIASTOLIC BLOOD PRESSURE: 72 MMHG | SYSTOLIC BLOOD PRESSURE: 116 MMHG | OXYGEN SATURATION: 99 % | TEMPERATURE: 97.8 F | HEART RATE: 64 BPM | RESPIRATION RATE: 18 BRPM | BODY MASS INDEX: 34.66 KG/M2 | HEIGHT: 64 IN

## 2025-04-07 DIAGNOSIS — C64.9 RENAL CELL CARCINOMA, UNSPECIFIED LATERALITY (HCC): ICD-10-CM

## 2025-04-07 DIAGNOSIS — D50.8 IRON DEFICIENCY ANEMIA SECONDARY TO INADEQUATE DIETARY IRON INTAKE: Primary | ICD-10-CM

## 2025-04-07 DIAGNOSIS — E53.8 B12 DEFICIENCY: ICD-10-CM

## 2025-04-07 PROCEDURE — 99214 OFFICE O/P EST MOD 30 MIN: CPT | Performed by: INTERNAL MEDICINE

## 2025-04-07 RX ORDER — SODIUM CHLORIDE 9 MG/ML
20 INJECTION, SOLUTION INTRAVENOUS ONCE
Status: CANCELLED | OUTPATIENT
Start: 2025-04-18

## 2025-04-07 RX ORDER — CYANOCOBALAMIN 1000 UG/ML
1000 INJECTION, SOLUTION INTRAMUSCULAR; SUBCUTANEOUS ONCE
Status: CANCELLED | OUTPATIENT
Start: 2025-04-18

## 2025-04-07 NOTE — ASSESSMENT & PLAN NOTE
Secondary to malabsorption  Will arrange for 4 weekly doses of vitamin B12 injections.  Recheck levels after that.  If adequate, will switch to monthly B12 injections that she should continue on.    Orders:    Vitamin B12; Future

## 2025-04-07 NOTE — PROGRESS NOTES
Name: Debbie Hendricks      : 1975      MRN: [id number]  Encounter Provider: Maria R Ramirez PA-C  Encounter Date: 2025   Encounter department: [facility] [place]  :    Debbie is a 50 y/o female who is s/p cholecystectomy with chronic anemia who is s/p sleeve gastrectomy and has hx of RCC s/p L nephrectomy who presents for follow-up.   Assessment & Plan  Chronic anemia  Ferritin still low at 9 and B12 low at 143.  Thankfully her hemoglobin has actually improved and is at 15.3.EGD and colonoscopy in  did not depict any etiology of anemia.  See below.       S/P gastric sleeve procedure  Pt will be due for repeat egd in about 3-5 yrs or so for ongoing saez's screening.   Orders:    Ambulatory Referral to Weight Management; Future    Low ferritin  See above.  Patient is following with hematology for iron infusions due to chronically low ferritin.  -We will proceed with capsule endoscopy to ensure that there is no GI etiology, like small bowel AVMs, that may be contributing to this though I explained that with her hgb  WNL this may be unlikely  -continue hematology follow-up   Orders:    Capsule endoscopy; Future    Weight gain  Pt says she has gained some weight and has been having a hard time losing it. She is being worked up for hormonal disturbances and thyroid disease by her other providers, but would like to see bariatric as she has not seen a bariatric team since her surgery in [place] in 2016            History of Present Illness   HPI  Debbie Hendricks is a 49 y.o. female who presents for follow-up.  patient says she is doing well.  She denies any heartburn, nausea or vomiting, abdominal pain, constipation, diarrhea, unintentional weight loss, fevers, chills, night sweats, bloody or black bowel movements, trouble swallowing or eating.  History obtained from: patient    Review of Systems   Constitutional:  Negative for chills, fever and unexpected weight change.    HENT:  Negative for ear pain, sore throat and trouble swallowing.    Eyes:  Negative for pain and visual disturbance.   Respiratory:  Negative for cough and shortness of breath.    Cardiovascular:  Negative for chest pain and palpitations.   Gastrointestinal:  Negative for abdominal distention, abdominal pain, anal bleeding, blood in stool, constipation, diarrhea, nausea, rectal pain and vomiting.   All other systems reviewed and are negative.    Medical History Reviewed by provider this encounter:     .  Past Medical History   Past Medical History:   Diagnosis Date    Anemia, iron deficiency     Hyperlipemia     Obesity, Class II, BMI 35-39.9     Renal carcinoma, left (HCC) 2014    Seasonal allergies      Past Surgical History:   Procedure Laterality Date    APPENDECTOMY  1985    CHOLECYSTECTOMY  2017    COLONOSCOPY      GASTRIC BYPASS  2016    HYSTERECTOMY  2017    NEPHRECTOMY Left 2014    renal carcinoma    TUBAL LIGATION  1998     Family History   Problem Relation Age of Onset    Hyperlipidemia Mother     Heart disease Mother     Cancer Father         stomach & ? bladder    Diabetes Father     Hypertension Father     Hyperlipidemia Father     Stroke Father     Prostate cancer Father     Lung cancer Maternal Grandmother     No Known Problems Maternal Grandfather     No Known Problems Paternal Grandmother     No Known Problems Paternal Grandfather     No Known Problems Maternal Aunt     No Known Problems Paternal Aunt     No Known Problems Paternal Aunt     Breast cancer Neg Hx       reports that she quit smoking about 21 years ago. Her smoking use included cigarettes. She has never used smokeless tobacco. She reports that she does not currently use alcohol. She reports that she does not use drugs.  Current Outpatient Medications   Medication Instructions    cholecalciferol (VITAMIN D3) 800 Units, Oral, Daily    estradiol valerate (DELESTROGEN) 20 mg, 2 times weekly    fexofenadine (ALLEGRA) 180 mg, Oral, Daily     fluticasone (FLONASE) 50 mcg/act nasal spray 1 spray, Nasal, Daily    montelukast (SINGULAIR) 10 mg, Oral, Daily at bedtime    olopatadine (PATANOL) 0.1 % ophthalmic solution 1 drop, Both Eyes, 2 times daily    Progesterone 100 MG CAPS     testosterone cypionate (DEPO-TESTOSTERONE) 50 mg, Once     Allergies   Allergen Reactions    [place] Oil - Food Allergy Other (See Comments)      Current Outpatient Medications on File Prior to Visit   Medication Sig Dispense Refill    cholecalciferol (VITAMIN D3) 400 units tablet Take 2 tablets (800 Units total) by mouth daily 60 tablet 5    estradiol valerate (DELESTROGEN) 20 MG/ML injection Inject 20 mg into a muscle 2 (two) times a week      fexofenadine (ALLEGRA) 180 MG tablet Take 1 tablet (180 mg total) by mouth daily      fluticasone (FLONASE) 50 mcg/act nasal spray 1 spray into each nostril daily      montelukast (SINGULAIR) 10 mg tablet Take 1 tablet (10 mg total) by mouth daily at bedtime 30 tablet 5    olopatadine (PATANOL) 0.1 % ophthalmic solution Administer 1 drop to both eyes 2 (two) times a day      Progesterone 100 MG CAPS       testosterone cypionate (DEPO-TESTOSTERONE) 200 mg/mL SOLN Inject 50 mg into a muscle once       No current facility-administered medications on file prior to visit.      Social History     Tobacco Use    Smoking status: Former     Current packs/day: 0.00     Types: Cigarettes     Quit date: 2003     Years since quittin.9    Smokeless tobacco: Never   Vaping Use    Vaping status: Never Used   Substance and Sexual Activity    Alcohol use: Not Currently    Drug use: Never    Sexual activity: Not on file        Objective   There were no vitals taken for this visit.     Physical Exam  Constitutional:       Appearance: Normal appearance.   Abdominal:      General: Bowel sounds are normal. There is no distension.      Palpations: There is no mass.      Tenderness: There is no abdominal tenderness. There is no guarding or rebound.    Neurological:      Mental Status: She is alert.         Administrative Statements   I have spent a total time of 30 minutes in caring for this patient on the day of the visit/encounter including Diagnostic results, Prognosis, Risks and benefits of tx options, Instructions for management, Patient and family education, Importance of tx compliance, Risk factor reductions, Impressions, Counseling / Coordination of care, Documenting in the medical record, Reviewing/placing orders in the medical record (including tests, medications, and/or procedures), Obtaining or reviewing history  , and Communicating with other healthcare professionals .

## 2025-04-07 NOTE — ASSESSMENT & PLAN NOTE
S/p nephrectomy in 2014.  No role for further surveillance.  Clinically no concerns for recurrence.  Last CT imaging of the abdomen and pelvis from October 2024 was reviewed, and there was no evidence of recurrence noted then.

## 2025-04-07 NOTE — PROGRESS NOTES
Name: Debbie Hendricks      : 1975      MRN: [id number]  Encounter Provider: Emili Rainey MD  Encounter Date: 2025   Encounter department: [facility]  :  Assessment & Plan  Iron deficiency anemia secondary to inadequate dietary iron intake  Most likely secondary to malabsorption.  Will arrange for 2 doses of IV Feraheme.  Recheck iron panel 6 weeks after the last iron infusion.  May need ongoing maintenance due to poor absorption of dietary iron.  Continue follow-up with GI for any further needs for additional workup.    Orders:    Iron Panel (Includes Ferritin, Iron Sat%, Iron, and TIBC); Future    B12 deficiency  Secondary to malabsorption  Will arrange for 4 weekly doses of vitamin B12 injections.  Recheck levels after that.  If adequate, will switch to monthly B12 injections that she should continue on.    Orders:    Vitamin B12; Future    Renal cell carcinoma, unspecified laterality (HCC)  S/p nephrectomy in .  No role for further surveillance.  Clinically no concerns for recurrence.  Last CT imaging of the abdomen and pelvis from 2024 was reviewed, and there was no evidence of recurrence noted then.         Return in about 3 months (around 2025) for Office Visit, Infusion - See Treatment Plan.    History of Present Illness   Chief Complaint   Patient presents with    Follow-up     History of Present Illness  Debbie Hendricks is a 49 y.o. female  who had been referred for management of anemia. 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 Patient has a remote h/o RCC s/p nephrectomy in . Also has a h/o hysterectomy for fibroids and gastric sleeve surgery in . She is supposed to be on a multivitamin, that she has been noncompliant with.  She has received many transfusions and IV iron in the past without difficulty.      I had first met her in May 2024.  On initial presentation, labs were as from  "4/27/24 were as follows- Hb: 10.9, Ferritin 3.  Her vitamin B12 level from May 2024 was 181. She received 2 doses of IV Feraheme between May to June 2024. She was lost to follow up subsequently.     She is here today to reestablish care.     Most recent labs from March 31, 2025 shows a normal.  P, iron 84, ferritin 9, transferrin saturation 20%, low vitamin D, vitamin B12 143, hemoglobin 16.3, normal differential.  She also follows with a functional medicine provider.       Review of Systems   Constitutional:  Positive for fatigue. Negative for fever and unexpected weight change.   Respiratory:  Positive for shortness of breath (on exertion).    Cardiovascular:  Negative for chest pain and leg swelling.   Gastrointestinal:  Negative for abdominal pain, blood in stool, nausea and vomiting.   Genitourinary:  Negative for hematuria and vaginal bleeding.   Neurological:  Positive for light-headedness. Negative for syncope.   Hematological:  Negative for adenopathy. Does not bruise/bleed easily.           Objective   /72 (BP Location: Right arm, Patient Position: Sitting, Cuff Size: Adult)   Pulse 64   Temp 97.8 °F (36.6 °C) (Temporal)   Resp 18   Ht 5' 4\" (1.626 m)   Wt 92.1 kg (203 lb)   SpO2 99%   BMI 34.84 kg/m²     Physical Exam  Vitals reviewed.   Constitutional:       Appearance: Normal appearance.   Cardiovascular:      Rate and Rhythm: Normal rate and regular rhythm.   Pulmonary:      Effort: Pulmonary effort is normal.      Breath sounds: Normal breath sounds.   Abdominal:      General: Abdomen is flat.      Palpations: Abdomen is soft.   Musculoskeletal:         General: No swelling.   Neurological:      General: No focal deficit present.      Mental Status: She is alert.       Physical Exam      Results    Labs: I have reviewed the following labs:  Results for orders placed or performed in visit on 03/31/25   Vitamin B12   Result Value Ref Range    Vitamin B-12 143 (L) 180 - 914 pg/mL "   Sedimentation rate, automated   Result Value Ref Range    Sed Rate 11 0 - 19 mm/hour   Estradiol   Result Value Ref Range    Estradiol 115.9 See Comment pg/mL   Testosterone, free, total   Result Value Ref Range    Testosterone, Free 0.9 0.0 - 4.2 pg/mL    TESTOSTERONE TOTAL 13 4 - 50 ng/dL   Progesterone   Result Value Ref Range    Progesterone 0.15 See Comment ng/mL   Result Value Ref Range    LH 8.8 See Comment mIU/mL   TIBC Panel (incl. Iron, TIBC, % Iron Saturation)   Result Value Ref Range    Iron Saturation 20 15 - 50 %    TIBC 420 250 - 450 ug/dL    Iron 84 50 - 212 ug/dL    Transferrin 300 203 - 362 mg/dL    UIBC 336 155 - 355 ug/dL   Result Value Ref Range    Ferritin 9 (L) 11 - 307 ng/mL   Lyme Total AB W Reflex to IGM/IGG   Result Value Ref Range    Lyme Total Antibodies Negative Negative   Bilirubin, direct   Result Value Ref Range    Bilirubin, Direct 0.10 0.00 - 0.20 mg/dL       Radiology Results Review: I personally reviewed the following image studies in PACS and associated radiology reports: CT abdomen/pelvis and CT head. My interpretation of the radiology images/reports is: YOUSIF.

## 2025-04-07 NOTE — ASSESSMENT & PLAN NOTE
Most likely secondary to malabsorption.  Will arrange for 2 doses of IV Feraheme.  Recheck iron panel 6 weeks after the last iron infusion.  May need ongoing maintenance due to poor absorption of dietary iron.  Continue follow-up with GI for any further needs for additional workup.    Orders:    Iron Panel (Includes Ferritin, Iron Sat%, Iron, and TIBC); Future

## 2025-04-08 ENCOUNTER — OFFICE VISIT (OUTPATIENT)
Dept: GASTROENTEROLOGY | Facility: CLINIC | Age: 50
End: 2025-04-08
Payer: COMMERCIAL

## 2025-04-08 VITALS
SYSTOLIC BLOOD PRESSURE: 102 MMHG | HEIGHT: 64 IN | BODY MASS INDEX: 34.66 KG/M2 | WEIGHT: 203 LBS | DIASTOLIC BLOOD PRESSURE: 62 MMHG | HEART RATE: 60 BPM

## 2025-04-08 DIAGNOSIS — R63.5 WEIGHT GAIN: ICD-10-CM

## 2025-04-08 DIAGNOSIS — Z90.3 S/P GASTRIC SLEEVE PROCEDURE: ICD-10-CM

## 2025-04-08 DIAGNOSIS — D64.9 CHRONIC ANEMIA: Primary | ICD-10-CM

## 2025-04-08 DIAGNOSIS — R79.0 LOW FERRITIN: ICD-10-CM

## 2025-04-08 LAB — DHEA SERPL-MCNC: 318 NG/DL (ref 31–701)

## 2025-04-08 PROCEDURE — 99214 OFFICE O/P EST MOD 30 MIN: CPT | Performed by: PHYSICIAN ASSISTANT

## 2025-04-08 RX ORDER — SODIUM CHLORIDE, SODIUM LACTATE, POTASSIUM CHLORIDE, CALCIUM CHLORIDE 600; 310; 30; 20 MG/100ML; MG/100ML; MG/100ML; MG/100ML
125 INJECTION, SOLUTION INTRAVENOUS CONTINUOUS
OUTPATIENT
Start: 2025-04-08

## 2025-04-09 LAB — ANDROSTANOLONE SERPL-MCNC: 7.1 NG/DL

## 2025-04-16 DIAGNOSIS — D50.8 IRON DEFICIENCY ANEMIA SECONDARY TO INADEQUATE DIETARY IRON INTAKE: ICD-10-CM

## 2025-04-16 DIAGNOSIS — E53.8 B12 DEFICIENCY: Primary | ICD-10-CM

## 2025-04-16 RX ORDER — CYANOCOBALAMIN 1000 UG/ML
1000 INJECTION, SOLUTION INTRAMUSCULAR; SUBCUTANEOUS ONCE
Status: CANCELLED | OUTPATIENT
Start: 2025-04-18

## 2025-04-16 RX ORDER — SODIUM CHLORIDE 9 MG/ML
20 INJECTION, SOLUTION INTRAVENOUS ONCE
Status: CANCELLED | OUTPATIENT
Start: 2025-04-18

## 2025-04-18 ENCOUNTER — HOSPITAL ENCOUNTER (OUTPATIENT)
Dept: INFUSION CENTER | Facility: HOSPITAL | Age: 50
End: 2025-04-18
Attending: INTERNAL MEDICINE
Payer: COMMERCIAL

## 2025-04-18 VITALS
DIASTOLIC BLOOD PRESSURE: 81 MMHG | HEART RATE: 73 BPM | OXYGEN SATURATION: 97 % | RESPIRATION RATE: 18 BRPM | TEMPERATURE: 97.6 F | SYSTOLIC BLOOD PRESSURE: 137 MMHG

## 2025-04-18 DIAGNOSIS — E53.8 B12 DEFICIENCY: ICD-10-CM

## 2025-04-18 DIAGNOSIS — D50.8 IRON DEFICIENCY ANEMIA SECONDARY TO INADEQUATE DIETARY IRON INTAKE: Primary | ICD-10-CM

## 2025-04-18 PROCEDURE — 96372 THER/PROPH/DIAG INJ SC/IM: CPT

## 2025-04-18 PROCEDURE — 96365 THER/PROPH/DIAG IV INF INIT: CPT

## 2025-04-18 RX ORDER — CYANOCOBALAMIN 1000 UG/ML
1000 INJECTION, SOLUTION INTRAMUSCULAR; SUBCUTANEOUS ONCE
Status: COMPLETED | OUTPATIENT
Start: 2025-04-18 | End: 2025-04-18

## 2025-04-18 RX ORDER — CYANOCOBALAMIN 1000 UG/ML
1000 INJECTION, SOLUTION INTRAMUSCULAR; SUBCUTANEOUS ONCE
Status: CANCELLED | OUTPATIENT
Start: 2025-04-25

## 2025-04-18 RX ORDER — SODIUM CHLORIDE 9 MG/ML
20 INJECTION, SOLUTION INTRAVENOUS ONCE
Status: CANCELLED | OUTPATIENT
Start: 2025-04-25

## 2025-04-18 RX ORDER — SODIUM CHLORIDE 9 MG/ML
20 INJECTION, SOLUTION INTRAVENOUS ONCE
Status: COMPLETED | OUTPATIENT
Start: 2025-04-18 | End: 2025-04-18

## 2025-04-18 RX ADMIN — FERUMOXYTOL 510 MG: 510 INJECTION INTRAVENOUS at 13:32

## 2025-04-18 RX ADMIN — SODIUM CHLORIDE 20 ML/HR: 9 INJECTION, SOLUTION INTRAVENOUS at 13:33

## 2025-04-18 RX ADMIN — CYANOCOBALAMIN 1000 MCG: 1000 INJECTION, SOLUTION INTRAMUSCULAR at 13:35

## 2025-04-18 NOTE — PROGRESS NOTES
Pt here for Feraheme infusion and Vitamin B 12 injection. IV started with no issue and is infusing with NSS @ KVO. Injection given in TIKA. Pt resting comfortably and has no further questions or concerns. Call bell with in reach.

## 2025-04-18 NOTE — PROGRESS NOTES
Debbie Hendricks  tolerated treatment well with no complications.      Debbie Hendricks is aware of future appt on 4/25 at 1 pm.     AVS printed and given to Debbie Hendricks:  Yes

## 2025-04-21 ENCOUNTER — HOSPITAL ENCOUNTER (OUTPATIENT)
Dept: GASTROENTEROLOGY | Facility: HOSPITAL | Age: 50
Discharge: HOME | End: 2025-04-21
Payer: COMMERCIAL

## 2025-04-21 DIAGNOSIS — R79.0 LOW FERRITIN: ICD-10-CM

## 2025-04-21 PROCEDURE — 91110 GI TRC IMG INTRAL ESOPH-ILE: CPT

## 2025-04-22 ENCOUNTER — TELEPHONE (OUTPATIENT)
Dept: BARIATRICS | Facility: CLINIC | Age: 50
End: 2025-04-22

## 2025-04-22 ENCOUNTER — CONSULT (OUTPATIENT)
Dept: BARIATRICS | Facility: CLINIC | Age: 50
End: 2025-04-22
Payer: COMMERCIAL

## 2025-04-22 VITALS
SYSTOLIC BLOOD PRESSURE: 110 MMHG | HEART RATE: 57 BPM | DIASTOLIC BLOOD PRESSURE: 68 MMHG | BODY MASS INDEX: 35.35 KG/M2 | HEIGHT: 63 IN | WEIGHT: 199.5 LBS | TEMPERATURE: 97.5 F

## 2025-04-22 DIAGNOSIS — E66.812 OBESITY, CLASS II, BMI 35-39.9: ICD-10-CM

## 2025-04-22 DIAGNOSIS — Z48.815 ENCOUNTER FOR SURGICAL AFTERCARE FOLLOWING SURGERY OF DIGESTIVE SYSTEM: Primary | ICD-10-CM

## 2025-04-22 DIAGNOSIS — D50.8 IRON DEFICIENCY ANEMIA SECONDARY TO INADEQUATE DIETARY IRON INTAKE: ICD-10-CM

## 2025-04-22 DIAGNOSIS — Z90.3 S/P GASTRIC SLEEVE PROCEDURE: ICD-10-CM

## 2025-04-22 DIAGNOSIS — K91.2 POSTSURGICAL MALABSORPTION: ICD-10-CM

## 2025-04-22 DIAGNOSIS — Z98.84 BARIATRIC SURGERY STATUS: ICD-10-CM

## 2025-04-22 DIAGNOSIS — C64.9 RENAL CELL CARCINOMA, UNSPECIFIED LATERALITY (HCC): ICD-10-CM

## 2025-04-22 DIAGNOSIS — Z90.5 H/O LEFT NEPHRECTOMY: ICD-10-CM

## 2025-04-22 PROCEDURE — 99204 OFFICE O/P NEW MOD 45 MIN: CPT | Performed by: PHYSICIAN ASSISTANT

## 2025-04-22 RX ORDER — TIRZEPATIDE 2.5 MG/.5ML
2.5 INJECTION, SOLUTION SUBCUTANEOUS WEEKLY
Qty: 2 ML | Refills: 0 | Status: SHIPPED | OUTPATIENT
Start: 2025-04-22 | End: 2025-05-20

## 2025-04-22 NOTE — PROGRESS NOTES
Date of surgery: 2016  Procedure: Sleeve  Performing surgeon: [place]    Initial Weight - 285 lb  Current Weight - 199.5 lb  Lionel Weight - 165 lb  Total Body Weight Loss (EWL)-   EWL% -   TWB % -     S/B 4/8

## 2025-04-22 NOTE — PATIENT INSTRUCTIONS
I have sent Zebound to your pharmacy. The prior authorization process will been done through our prior authorization team and can take up to 3-4 weeks to process through the insurance.     - Start Zepbound 2.5 mg subcutaneously weekly. After you have taken the second pen, please give me an update, as we will likely increase the dose the next month if you are tolerating it well.     - Side effects of Zepbound include nausea, vomiting, diarrhea, or constipation. Keep an eye on your heart rate while on Zepbound. If you resting heart rate is greater than 100 beats per minutes, please notify me. If you develop severe abdominal pain, stop Zepbound and go to the emergency room, as that could be a sign of pancreatitis.  - routine thyroid checks      - Please notify me if you have surgery, upper endoscopy, or colonoscopy scheduled, as we typically hold Zepbound for one week prior to the procedure.     - Zepbound can reduce the effectiveness of oral hormonal birth control (birth control pills). Recommend a barrier backup method such as condoms to prevent pregnancy.     Follow-up in 3 months. We kindly ask that your arrive 15 minutes before your scheduled appointment time with your provider to allow our staff to room you, get your vital signs and update your chart.    Get lab work done. Please call the office if you need a script.  It is recommended to check with your insurance BEFORE getting labs done to make sure they are covered by your policy.      Call our office if you have any problems with abdominal pain especially associated with fever, chills, nausea, vomiting or any other concerns.    All  Post-bariatric surgery patients should be aware that very small quantities of any alcohol can cause impairment and it is very possible not to feel the effect. The effect can be in the system for several hours.  It is also a stomach irritant.     It is advised to AVOID alcohol, Nonsteroidal antiinflammatory drugs (NSAIDS) and  nicotine of all forms . Any of these can cause stomach irritation/pain.    Discussed the effects of alcohol on a bariatric patient and the increased impairment risk.     Keep up the good work!

## 2025-04-22 NOTE — TELEPHONE ENCOUNTER
PA for Zepbound SUBMITTED to [facility]     via    [x]CMM-KEY: [id number]  []Surescripts-Case ID #   []Availity-Auth ID # NDC #   []Faxed to plan   []Other website   []Phone call Case ID #     [x]PA sent as URGENT    All office notes, labs and other pertaining documents and studies sent. Clinical questions answered. Awaiting determination from insurance company.     Turnaround time for your insurance to make a decision on your Prior Authorization can take 7-21 business days.                 stated

## 2025-04-22 NOTE — TELEPHONE ENCOUNTER
PA for Zepbound  APPROVED     Date(s) approved 4/22/2025    Case #    Patient advised by          [x]MyChart Message  []Phone call   []LMOM  []L/M to call office as no active Communication consent on file  []Unable to leave detailed message as VM not approved on Communication consent       Pharmacy advised by    [x]Fax  []Phone call  []Secure Chat    Specialty Pharmacy    []     Approval letter scanned into Media Yes

## 2025-04-22 NOTE — PROGRESS NOTES
Assessment/Plan:     Patient ID: Debbie Hendricks is a 49 y.o. female.     Bariatric Surgery Status    -s/p Vertical Sleeve Gastrectomy in [place] in 2016. Presents to the office today as new pt to establish care.  No bariatric care in many years     No major postop complications. Initial weight 285 lbs. KOREY 165 lbs and was able to maintain at this weight for several years until she hit menopause in 2022. Has gained about 30 since that time and has been unable to lose the weight. Currently on HRT for menopause but still unable to lose the weight. 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 Diet also could use some work - typically eats about 2 meals a day.     Patient with acid reflux since her surgery. She is not taking anything for her symptoms. Her last EGD was in 6/2024 showing:  IMPRESSION:  The upper third of the esophagus, middle third of the esophagus and lower third of the esophagus appeared normal.  Healthy previous sleeve gastrectomy in the stomach; performed cold forceps biopsies  Polyp measuring smaller than 5 mm in the stomach; performed cold forceps biopsy with partial removal; placed 1 clip successfully  The duodenal bulb, 1st part of the duodenum and 2nd part of the duodenum appeared normal.  Final Diagnosis   A. Stomach, Bx. H-pylori:  - Mild chronic inactive gastritis.  - Negative for H. pylori by routine H&E.  - Negative for malignancy.      B. Stomach, Polyp:  - Polypoid gastric mucosa with foveolar hyperplasia.  See comment.  - Negative for dysplasia and malignancy.       Of note, she underwent  a capsule EGD yesterday due to chronically low ferritin., to rule out GI etiology, like small bowel AVMs, that may be contributing to this     She is interested in trialing zepbound to help with weight loss  Also schedule RD visit    Patient denies personal and family history of MEN2 tumors and medullary thyroid/thyroid carcinoma. Patient denies personal history of pancreatitis.         Side effects:  Abdominal pain, constipation, diarrhea, nausea, vomiting, fatigue, sinusitis, alopecia, UTI, diabetic retinopathy, hypotension, dizziness, indigestion  Precaution: Denies Acute pancreatitis and cholecystitis, suicidal ideations  Contraindication: Denies Personal/family history of medullary thyroid cancer, MEN syndrome type II      I have sent Zebound to your pharmacy. The prior authorization process will been done through our prior authorization team and can take up to 3-4 weeks to process through the insurance.     - Start Zepbound 2.5 mg subcutaneously weekly. After you have taken the second pen, please give me an update, as we will likely increase the dose the next month if you are tolerating it well.     - Side effects of Zepbound include nausea, vomiting, diarrhea, or constipation. Keep an eye on your heart rate while on Zepbound. If you resting heart rate is greater than 100 beats per minutes, please notify me. If you develop severe abdominal pain, stop Zepbound and go to the emergency room, as that could be a sign of pancreatitis.  - routine thyroid checks      - Please notify me if you have surgery, upper endoscopy, or colonoscopy scheduled, as we typically hold Zepbound for one week prior to the procedure.     - Zepbound can reduce the effectiveness of oral hormonal birth control (birth control pills). Recommend a barrier backup method such as condoms to prevent pregnancy.     Initial weight: 199 lbs   5%: 9 lbs      Patient understands the risk and benefits of using this medication as well as the side effects.  Medication management contract signed.      Initial weight loss goal of 5-10% weight loss for improved health as studies have shown this has proven to decrease risk of obesity related conditions and co-morbid conditions and/or prevent weight-related complications. Explained the importance of making lifestyle modifications changes in conjunction with weight loss medications.         Recommended to  not skip meals.  Spoke about practicing mindful eating with frequent meals and portion control.  Recommend to avoid snacking at bedtime especially with a high carbohydrate load and to be consistent with protein intake.    Recommend adequate hydration which is at least half your body weight in ounces unless medically contraindicated (ie. Systolic heart failure) with also considering GI losses and medication induced fluid loss if applicable. Recommend incorporating resistance training and/or strength training to help improve metabolic rate.       Iron def anemia   - following with hematology, currently getting iron infusions and b12 shots       Continued/Maintain healthy weight loss with good nutrition intakes.  Adequate hydration with at least 64oz. fluid intake.  Follow diet as discussed.  Follow vitamin and mineral recommendations as reviewed with you.  Exercise as tolerated.    Colonoscopy referral made: utd    Follow-up in 3 months. We kindly ask that your arrive 15 minutes before your scheduled appointment time with your provider to allow our staff to room you, get your vital signs and update your chart.    Get lab work done. Please call the office if you need a script.  It is recommended to check with your insurance BEFORE getting labs done to make sure they are covered by your policy.      Call our office if you have any problems with abdominal pain especially associated with fever, chills, nausea, vomiting or any other concerns.    All  Post-bariatric surgery patients should be aware that very small quantities of any alcohol can cause impairment and it is very possible not to feel the effect. The effect can be in the system for several hours.  It is also a stomach irritant.     It is advised to AVOID alcohol, Nonsteroidal antiinflammatory drugs (NSAIDS) and nicotine of all forms . Any of these can cause stomach irritation/pain.    Discussed the effects of alcohol on a bariatric patient and the increased  impairment risk.     Keep up the good work!     Postsurgical Malabsorption   -At risk for malabsorption of vitamins/minerals secondary to malabsorption and restriction of intake from bariatric surgery  -Currently taking adequate postop bariatric surgery vitamin supplementation  -Next set of bariatric labs ordered for approximately 3 months  -Patient received education about the importance of adhering to a lifelong supplementation regimen to avoid vitamin/mineral deficiencies      Diagnoses and all orders for this visit:    Encounter for surgical aftercare following surgery of digestive system  -     tirzepatide (Zepbound) 2.5 mg/0.5 mL auto-injector; Inject 0.5 mL (2.5 mg total) under the skin once a week for 28 days  -     Zinc; Future  -     Vitamin D 25 hydroxy; Future  -     Vitamin B1, whole blood; Future  -     Vitamin A; Future  -     PTH, intact; Future  -     CBC and Platelet; Future  -     Comprehensive metabolic panel; Future  -     Folate; Future  -     HEMOGLOBIN A1C W/ EAG ESTIMATION; Future  -     Lipid panel; Future  -     TSH, 3rd generation with Free T4 reflex; Future    S/P gastric sleeve procedure  -     Ambulatory Referral to Weight Management  -     tirzepatide (Zepbound) 2.5 mg/0.5 mL auto-injector; Inject 0.5 mL (2.5 mg total) under the skin once a week for 28 days  -     Zinc; Future  -     Vitamin D 25 hydroxy; Future  -     Vitamin B1, whole blood; Future  -     Vitamin A; Future  -     PTH, intact; Future  -     CBC and Platelet; Future  -     Comprehensive metabolic panel; Future  -     Folate; Future  -     HEMOGLOBIN A1C W/ EAG ESTIMATION; Future  -     Lipid panel; Future  -     TSH, 3rd generation with Free T4 reflex; Future    Bariatric surgery status  -     tirzepatide (Zepbound) 2.5 mg/0.5 mL auto-injector; Inject 0.5 mL (2.5 mg total) under the skin once a week for 28 days  -     Zinc; Future  -     Vitamin D 25 hydroxy; Future  -     Vitamin B1, whole blood; Future  -      Vitamin A; Future  -     PTH, intact; Future  -     CBC and Platelet; Future  -     Comprehensive metabolic panel; Future  -     Folate; Future  -     HEMOGLOBIN A1C W/ EAG ESTIMATION; Future  -     Lipid panel; Future  -     TSH, 3rd generation with Free T4 reflex; Future    Postsurgical malabsorption  -     tirzepatide (Zepbound) 2.5 mg/0.5 mL auto-injector; Inject 0.5 mL (2.5 mg total) under the skin once a week for 28 days  -     Zinc; Future  -     Vitamin D 25 hydroxy; Future  -     Vitamin B1, whole blood; Future  -     Vitamin A; Future  -     PTH, intact; Future  -     CBC and Platelet; Future  -     Comprehensive metabolic panel; Future  -     Folate; Future  -     HEMOGLOBIN A1C W/ EAG ESTIMATION; Future  -     Lipid panel; Future  -     TSH, 3rd generation with Free T4 reflex; Future    Renal cell carcinoma, unspecified laterality (HCC)  -     tirzepatide (Zepbound) 2.5 mg/0.5 mL auto-injector; Inject 0.5 mL (2.5 mg total) under the skin once a week for 28 days  -     Zinc; Future  -     Vitamin D 25 hydroxy; Future  -     Vitamin B1, whole blood; Future  -     Vitamin A; Future  -     PTH, intact; Future  -     CBC and Platelet; Future  -     Comprehensive metabolic panel; Future  -     Folate; Future  -     HEMOGLOBIN A1C W/ EAG ESTIMATION; Future  -     Lipid panel; Future  -     TSH, 3rd generation with Free T4 reflex; Future    H/O left nephrectomy  -     tirzepatide (Zepbound) 2.5 mg/0.5 mL auto-injector; Inject 0.5 mL (2.5 mg total) under the skin once a week for 28 days  -     Zinc; Future  -     Vitamin D 25 hydroxy; Future  -     Vitamin B1, whole blood; Future  -     Vitamin A; Future  -     PTH, intact; Future  -     CBC and Platelet; Future  -     Comprehensive metabolic panel; Future  -     Folate; Future  -     HEMOGLOBIN A1C W/ EAG ESTIMATION; Future  -     Lipid panel; Future  -     TSH, 3rd generation with Free T4 reflex; Future    Iron deficiency anemia secondary to inadequate dietary  iron intake  -     tirzepatide (Zepbound) 2.5 mg/0.5 mL auto-injector; Inject 0.5 mL (2.5 mg total) under the skin once a week for 28 days  -     Zinc; Future  -     Vitamin D 25 hydroxy; Future  -     Vitamin B1, whole blood; Future  -     Vitamin A; Future  -     PTH, intact; Future  -     CBC and Platelet; Future  -     Comprehensive metabolic panel; Future  -     Folate; Future  -     HEMOGLOBIN A1C W/ EAG ESTIMATION; Future  -     Lipid panel; Future  -     TSH, 3rd generation with Free T4 reflex; Future    Obesity, Class II, BMI 35-39.9  -     tirzepatide (Zepbound) 2.5 mg/0.5 mL auto-injector; Inject 0.5 mL (2.5 mg total) under the skin once a week for 28 days  -     Zinc; Future  -     Vitamin D 25 hydroxy; Future  -     Vitamin B1, whole blood; Future  -     Vitamin A; Future  -     PTH, intact; Future  -     CBC and Platelet; Future  -     Comprehensive metabolic panel; Future  -     Folate; Future  -     HEMOGLOBIN A1C W/ EAG ESTIMATION; Future  -     Lipid panel; Future  -     TSH, 3rd generation with Free T4 reflex; Future         Subjective:      Patient ID: Debbie Hendricks is a 49 y.o. female.  -s/p Vertical Sleeve Gastrectomy in [place] in 2016. Presents to the office today as new pt to establish care    Current BMI is Body mass index is 35.91 kg/m².    Tolerating a regular diet-yes  Eating at least 60 grams of protein per day- could improve   Drinking at least 64 ounces of fluid per day- could improve   Sufficient exercise-no  Using nicotine-no  Using alcohol-no  Supplements: Multivitamins and vit D  and calcium and mag and zinc      The following portions of the patient's history were reviewed and updated as appropriate: allergies, current medications, past family history, past medical history, past social history, past surgical history and problem list.    Review of Systems   Constitutional: Negative.    Respiratory: Negative.     Cardiovascular: Negative.    Gastrointestinal: Negative.   "  Neurological: Negative.    Psychiatric/Behavioral: Negative.           Objective:    /68 (Patient Position: Sitting, Cuff Size: Large)   Pulse 57   Temp 97.5 °F (36.4 °C) (Tympanic)   Ht 5' 2.5\" (1.588 m)   Wt 90.5 kg (199 lb 8 oz)   BMI 35.91 kg/m²      Physical Exam  Vitals and nursing note reviewed.   Constitutional:       Appearance: Normal appearance. She is obese.   HENT:      Head: Normocephalic and atraumatic.   Eyes:      Extraocular Movements: Extraocular movements intact.   Cardiovascular:      Rate and Rhythm: Normal rate.   Pulmonary:      Effort: Pulmonary effort is normal.   Musculoskeletal:         General: Normal range of motion.      Cervical back: Normal range of motion.   Skin:     General: Skin is warm and dry.   Neurological:      General: No focal deficit present.      Mental Status: She is alert and oriented to person, place, and time.   Psychiatric:         Mood and Affect: Mood normal.             "

## 2025-04-25 ENCOUNTER — TELEPHONE (OUTPATIENT)
Age: 50
End: 2025-04-25

## 2025-04-25 ENCOUNTER — HOSPITAL ENCOUNTER (OUTPATIENT)
Dept: INFUSION CENTER | Facility: HOSPITAL | Age: 50
End: 2025-04-25
Attending: INTERNAL MEDICINE
Payer: COMMERCIAL

## 2025-04-25 VITALS
SYSTOLIC BLOOD PRESSURE: 111 MMHG | RESPIRATION RATE: 18 BRPM | TEMPERATURE: 97.6 F | HEART RATE: 63 BPM | DIASTOLIC BLOOD PRESSURE: 66 MMHG | OXYGEN SATURATION: 95 %

## 2025-04-25 DIAGNOSIS — D50.8 IRON DEFICIENCY ANEMIA SECONDARY TO INADEQUATE DIETARY IRON INTAKE: Primary | ICD-10-CM

## 2025-04-25 DIAGNOSIS — E53.8 B12 DEFICIENCY: ICD-10-CM

## 2025-04-25 PROCEDURE — 96365 THER/PROPH/DIAG IV INF INIT: CPT

## 2025-04-25 PROCEDURE — 96372 THER/PROPH/DIAG INJ SC/IM: CPT

## 2025-04-25 RX ORDER — CYANOCOBALAMIN 1000 UG/ML
1000 INJECTION, SOLUTION INTRAMUSCULAR; SUBCUTANEOUS ONCE
Status: COMPLETED | OUTPATIENT
Start: 2025-04-25 | End: 2025-04-25

## 2025-04-25 RX ORDER — CYANOCOBALAMIN 1000 UG/ML
1000 INJECTION, SOLUTION INTRAMUSCULAR; SUBCUTANEOUS ONCE
Status: CANCELLED | OUTPATIENT
Start: 2025-05-02

## 2025-04-25 RX ORDER — SODIUM CHLORIDE 9 MG/ML
20 INJECTION, SOLUTION INTRAVENOUS ONCE
Status: COMPLETED | OUTPATIENT
Start: 2025-04-25 | End: 2025-04-25

## 2025-04-25 RX ORDER — SODIUM CHLORIDE 9 MG/ML
20 INJECTION, SOLUTION INTRAVENOUS ONCE
Status: CANCELLED | OUTPATIENT
Start: 2025-05-02

## 2025-04-25 RX ADMIN — FERUMOXYTOL 510 MG: 510 INJECTION INTRAVENOUS at 13:27

## 2025-04-25 RX ADMIN — SODIUM CHLORIDE 20 ML/HR: 9 INJECTION, SOLUTION INTRAVENOUS at 13:27

## 2025-04-25 RX ADMIN — CYANOCOBALAMIN 1000 MCG: 1000 INJECTION, SOLUTION INTRAMUSCULAR at 13:54

## 2025-04-25 NOTE — PROGRESS NOTES
Patient completed Feraheme infusion. However at very end she became flushed and felt nauseated. Symptoms cleared quickly with running saline. VSS for discharge. Notified Wen Burden RN of this event. Pt currently has no more appointments to be made. Left unit ambulatory with steady gait.

## 2025-04-25 NOTE — TELEPHONE ENCOUNTER
Received message from Veronica at  infusion. Patient started feeling nauseated and flushed at the end of her feraheme infusion. Infusion was stopped and fluids were opened. Symptoms resolved and she was able to be safely discharged. This was dose 2/2. I will let Dr. Rainey know in case she would need future iron infusions, as she may need premeds prior.

## 2025-05-01 ENCOUNTER — RESULTS FOLLOW-UP (OUTPATIENT)
Dept: GASTROENTEROLOGY | Facility: CLINIC | Age: 50
End: 2025-05-01

## 2025-05-02 ENCOUNTER — HOSPITAL ENCOUNTER (OUTPATIENT)
Dept: INFUSION CENTER | Facility: HOSPITAL | Age: 50
End: 2025-05-02
Attending: INTERNAL MEDICINE
Payer: COMMERCIAL

## 2025-05-02 DIAGNOSIS — E53.8 B12 DEFICIENCY: Primary | ICD-10-CM

## 2025-05-02 PROCEDURE — 96372 THER/PROPH/DIAG INJ SC/IM: CPT

## 2025-05-02 RX ORDER — CYANOCOBALAMIN 1000 UG/ML
1000 INJECTION, SOLUTION INTRAMUSCULAR; SUBCUTANEOUS ONCE
Status: COMPLETED | OUTPATIENT
Start: 2025-05-02 | End: 2025-05-02

## 2025-05-02 RX ORDER — CYANOCOBALAMIN 1000 UG/ML
1000 INJECTION, SOLUTION INTRAMUSCULAR; SUBCUTANEOUS ONCE
OUTPATIENT
Start: 2025-05-09

## 2025-05-02 RX ADMIN — CYANOCOBALAMIN 1000 MCG: 1000 INJECTION, SOLUTION INTRAMUSCULAR at 08:06

## 2025-05-02 NOTE — PROGRESS NOTES
Debbie Hendricks  tolerated treatment well with no complications.      Debbie Hendricks is aware of future appt on 5/9/25 at 0800.     AVS printed and given to Debbie Hendricks:  No (Declined by Debbie Hendricks)

## 2025-05-08 ENCOUNTER — HOSPITAL ENCOUNTER (OUTPATIENT)
Dept: MAMMOGRAPHY | Facility: CLINIC | Age: 50
Discharge: HOME | End: 2025-05-08
Payer: COMMERCIAL

## 2025-05-08 VITALS — WEIGHT: 199 LBS | BODY MASS INDEX: 35.26 KG/M2 | HEIGHT: 63 IN

## 2025-05-08 DIAGNOSIS — Z12.31 ENCOUNTER FOR SCREENING MAMMOGRAM FOR BREAST CANCER: ICD-10-CM

## 2025-05-08 PROCEDURE — 77063 BREAST TOMOSYNTHESIS BI: CPT

## 2025-05-08 PROCEDURE — 77067 SCR MAMMO BI INCL CAD: CPT

## 2025-05-09 ENCOUNTER — HOSPITAL ENCOUNTER (OUTPATIENT)
Dept: INFUSION CENTER | Facility: HOSPITAL | Age: 50
End: 2025-05-09
Attending: INTERNAL MEDICINE
Payer: COMMERCIAL

## 2025-05-09 VITALS
SYSTOLIC BLOOD PRESSURE: 117 MMHG | DIASTOLIC BLOOD PRESSURE: 65 MMHG | HEART RATE: 65 BPM | RESPIRATION RATE: 18 BRPM | OXYGEN SATURATION: 97 %

## 2025-05-09 DIAGNOSIS — C64.9 RENAL CELL CARCINOMA, UNSPECIFIED LATERALITY (HCC): ICD-10-CM

## 2025-05-09 DIAGNOSIS — Z90.3 S/P GASTRIC SLEEVE PROCEDURE: ICD-10-CM

## 2025-05-09 DIAGNOSIS — E66.812 OBESITY, CLASS II, BMI 35-39.9: ICD-10-CM

## 2025-05-09 DIAGNOSIS — Z90.5 H/O LEFT NEPHRECTOMY: ICD-10-CM

## 2025-05-09 DIAGNOSIS — Z48.815 ENCOUNTER FOR SURGICAL AFTERCARE FOLLOWING SURGERY OF DIGESTIVE SYSTEM: ICD-10-CM

## 2025-05-09 DIAGNOSIS — E53.8 B12 DEFICIENCY: Primary | ICD-10-CM

## 2025-05-09 DIAGNOSIS — K91.2 POSTSURGICAL MALABSORPTION: ICD-10-CM

## 2025-05-09 DIAGNOSIS — Z98.84 BARIATRIC SURGERY STATUS: ICD-10-CM

## 2025-05-09 DIAGNOSIS — D50.8 IRON DEFICIENCY ANEMIA SECONDARY TO INADEQUATE DIETARY IRON INTAKE: ICD-10-CM

## 2025-05-09 PROCEDURE — 96372 THER/PROPH/DIAG INJ SC/IM: CPT

## 2025-05-09 RX ORDER — CYANOCOBALAMIN 1000 UG/ML
1000 INJECTION, SOLUTION INTRAMUSCULAR; SUBCUTANEOUS ONCE
Status: CANCELLED | OUTPATIENT
Start: 2025-05-16

## 2025-05-09 RX ORDER — CYANOCOBALAMIN 1000 UG/ML
1000 INJECTION, SOLUTION INTRAMUSCULAR; SUBCUTANEOUS ONCE
Status: COMPLETED | OUTPATIENT
Start: 2025-05-09 | End: 2025-05-09

## 2025-05-09 RX ADMIN — CYANOCOBALAMIN 1000 MCG: 1000 INJECTION INTRAMUSCULAR; SUBCUTANEOUS at 08:12

## 2025-05-09 NOTE — PROGRESS NOTES
Pt here for B12; injection given with no adverse reactions; bandaid dry and intact; therapy plan completed; pt has f/u appt with physician; left unit ambulatory with steady gait.

## 2025-05-09 NOTE — TELEPHONE ENCOUNTER
How are you tolerating the medication?   [] Nausea  [] Vomiting  [] Diarrhea  [x] Asymptomatic  [] Other:    Last visit weight:199     Current weight:197    Date of last injection:5/8/2025     How many injections do you have left:1     Please send medication to [facility]

## 2025-05-12 DIAGNOSIS — E66.812 OBESITY, CLASS II, BMI 35-39.9: Primary | ICD-10-CM

## 2025-05-12 RX ORDER — TIRZEPATIDE 5 MG/.5ML
5 INJECTION, SOLUTION SUBCUTANEOUS WEEKLY
Qty: 2 ML | Refills: 2 | Status: SHIPPED | OUTPATIENT
Start: 2025-05-12 | End: 2025-06-03

## 2025-05-12 RX ORDER — TIRZEPATIDE 2.5 MG/.5ML
2.5 INJECTION, SOLUTION SUBCUTANEOUS WEEKLY
Qty: 2 ML | Refills: 0 | OUTPATIENT
Start: 2025-05-12 | End: 2025-06-09

## 2025-05-13 ENCOUNTER — RESULTS FOLLOW-UP (OUTPATIENT)
Dept: OBGYN CLINIC | Facility: MEDICAL CENTER | Age: 50
End: 2025-05-13

## 2025-05-13 NOTE — RESULT ENCOUNTER NOTE
Please make sure patient gets additional imaging (incomplete imaging).  This is Dr. Santo's patient.

## 2025-05-16 ENCOUNTER — HOSPITAL ENCOUNTER (OUTPATIENT)
Dept: MAMMOGRAPHY | Facility: CLINIC | Age: 50
Discharge: HOME | End: 2025-05-16
Attending: OBSTETRICS & GYNECOLOGY
Payer: COMMERCIAL

## 2025-05-16 ENCOUNTER — HOSPITAL ENCOUNTER (OUTPATIENT)
Dept: ULTRASOUND IMAGING | Facility: CLINIC | Age: 50
Discharge: HOME | End: 2025-05-16
Attending: OBSTETRICS & GYNECOLOGY
Payer: COMMERCIAL

## 2025-05-16 DIAGNOSIS — R92.8 ABNORMAL MAMMOGRAM: ICD-10-CM

## 2025-05-16 PROCEDURE — 76642 ULTRASOUND BREAST LIMITED: CPT

## 2025-05-16 PROCEDURE — 77066 DX MAMMO INCL CAD BI: CPT

## 2025-05-16 PROCEDURE — G0279 TOMOSYNTHESIS, MAMMO: HCPCS

## 2025-05-20 ENCOUNTER — RESULTS FOLLOW-UP (OUTPATIENT)
Dept: OBGYN CLINIC | Facility: MEDICAL CENTER | Age: 50
End: 2025-05-20

## 2025-05-20 NOTE — RESULT ENCOUNTER NOTE
Please notify pt of radiology recommendation for diagnostic mammogram in 6 months of the left breast and ultrasound in 6 months of the right breast.  This is Dr Santo's pt.

## 2025-07-01 ENCOUNTER — TELEPHONE (OUTPATIENT)
Dept: HEMATOLOGY ONCOLOGY | Facility: CLINIC | Age: 50
End: 2025-07-01

## 2025-07-01 NOTE — TELEPHONE ENCOUNTER
Left message advising patient appt has been changed from 7/7 to 7/15 at 9:40 due to a change in the providers schedule.  Hopeline number provided if time and date does not work for patient.

## 2025-07-15 ENCOUNTER — TELEPHONE (OUTPATIENT)
Age: 50
End: 2025-07-15

## 2025-07-18 ENCOUNTER — TELEMEDICINE (OUTPATIENT)
Dept: OTHER | Facility: HOSPITAL | Age: 50
End: 2025-07-18
Payer: COMMERCIAL
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
## 2025-07-18 DIAGNOSIS — S40.861A TICK BITE OF RIGHT UPPER ARM, INITIAL ENCOUNTER: Primary | ICD-10-CM

## 2025-07-18 PROBLEM — J45.909 CHILDHOOD ASTHMA: Status: RESOLVED | Noted: 2024-04-25 | Resolved: 2025-07-18

## 2025-07-18 PROBLEM — S83.281A ACUTE LATERAL MENISCUS TEAR OF RIGHT KNEE: Status: RESOLVED | Noted: 2024-04-25 | Resolved: 2025-07-18

## 2025-07-18 PROBLEM — D64.9 ANEMIA: Status: RESOLVED | Noted: 2024-04-25 | Resolved: 2025-07-18

## 2025-07-18 PROBLEM — G51.0 BELL'S PALSY: Status: RESOLVED | Noted: 2024-04-25 | Resolved: 2025-07-18

## 2025-07-18 PROCEDURE — 99212 OFFICE O/P EST SF 10 MIN: CPT | Performed by: PHYSICIAN ASSISTANT

## 2025-07-18 NOTE — PROGRESS NOTES
Virtual Regular Visit  Name: Debbie Hendricks      : 1975      MRN: [id number]  Encounter Provider: Shannon D Severino, PA-C  Encounter Date: 2025   Encounter department: VIRTUAL CARE       Verification of patient location:  Patient is located at Home in the following state in which I hold an active license [place] :  Assessment & Plan  Tick bite of right upper arm, initial encounter             Encounter provider Shannon D Severino, PA-C    The patient was identified by name and date of birth. Debbie Hendricks was informed that this is a telemedicine visit and that the visit is being conducted through the Epic Embedded platform. She agrees to proceed..  My office door was closed. No one else was in the room. She acknowledged consent and understanding of privacy and security of the video platform. The patient has agreed to participate and understands they can discontinue the visit at any time.    Patient is aware this is a billable service.     History obtained from: patient  History of Present Illness     Pt reports she found a tick on R shoulder, was attached and she pulled it off. Has the tick and it is still alive. Has dogs which are empirically treated for tick and flea prevention. Tick identified as dog tick by me. Can treat as insect bite with basic first aid.      Review of Systems   Constitutional:  Negative for fever.   HENT:  Negative for nosebleeds.    Eyes:  Negative for redness.   Respiratory:  Negative for shortness of breath.    Cardiovascular:  Negative for chest pain.   Gastrointestinal:  Negative for blood in stool.   Genitourinary:  Negative for hematuria.   Musculoskeletal:  Negative for gait problem.   Skin:  Positive for color change. Negative for rash.   Neurological:  Negative for seizures.   Psychiatric/Behavioral:  Negative for behavioral problems.        Objective   There were no vitals taken for this visit.    Physical Exam  Constitutional:       General: She is not in acute  distress.     Appearance: Normal appearance. She is not toxic-appearing.   HENT:      Head: Normocephalic and atraumatic.      Nose: No rhinorrhea.      Mouth/Throat:      Mouth: Mucous membranes are moist.     Eyes:      Conjunctiva/sclera: Conjunctivae normal.     Pulmonary:      Effort: Pulmonary effort is normal. No respiratory distress.      Breath sounds: No wheezing (no gross audible wheeze through computer).     Musculoskeletal:      Cervical back: Normal range of motion.     Skin:     Findings: No lesion (no fausto appreciated by me) or rash (on face or neck).     Neurological:      Mental Status: She is alert.      Cranial Nerves: No dysarthria or facial asymmetry.     Psychiatric:         Mood and Affect: Mood is anxious.         Behavior: Behavior normal.         Visit Time  Total Visit Duration: 10 minutes not including the time spent for establishing the audio/video connection.

## 2025-07-18 NOTE — PATIENT INSTRUCTIONS
"You can send your tick off for free testing at the lab at [facility] if desired.  A one time dose of doxycycline 200 mg is effective at preventing Lyme transmission after bite of deer tick  [facility] Phone number is [phone] if you need assistance or have further questions     Patient Education     Insect bites and stings   The Basics   Written by the doctors and editors at [facility]   How are insect bites and stings different? -- When an insect bites you, it uses its mouth parts. When an insect stings you, it uses a special \"stinger\" on the back of its body.   Biting insects, like mosquitoes and ticks, can transfer blood from other people and animals that they've bitten on to you. Some diseases and infections can be spread this way.   Stinging insects, like bees, wasps, and fire ants, do not usually carry disease. But stinging insects can inject you with \"venom.\" This can irritate your skin. Plus, a sting can be deadly to people who are severely allergic to the insect venom.  What is a normal reaction to an insect sting? -- Insect stings can cause the area around the sting to swell, turn red, hurt, and feel hot (picture 1).  To treat the pain and swelling around the area of the sting, you can:   Wash the area with soap and cool water.   Keep the area clean, and try not to scratch it.   Put a cold, damp washcloth on the area.   Take or apply anti-itch medicine.   Take a non-prescription pain medicine for the pain.  The reaction usually gets better in an hour or 2. But for some people, swelling around the sting can last for days. This is called a \"large local reaction.\" It is not dangerous, and it is not the same as an allergic reaction.  Some people do have a severe allergic reaction to insect stings. This is called \"anaphylaxis.\" Signs include hives, swelling, and trouble breathing.  What should I know about tick bites? -- Ticks are found in the grass and on shrubs, and can attach to people walking " by. One type of tick can spread Lyme disease. But a tick has to stay attached for a while before it can give you the infection.  If you are bitten by a tick, gently remove the tick from your skin using tweezers. Save the tick by sealing it in a piece of clear tape. If you can't save it, try to remember its color and size. This can help your doctor or nurse figure out if it might be the type of tick that carries Lyme disease.  Call your doctor or nurse if:   You cannot remove a tick from yourself or your child.   You get a fever or rash within the next few weeks.   You think that you have had a tick attached for at least 36 hours (a day and a half).  Your doctor or nurse can then decide if you need to take a dose of an antibiotic to help prevent Lyme. Doctors only recommend antibiotics to prevent Lyme disease in some situations. It depends on your age, where you live, what kind of tick bit you, and how long it was attached.  What can I do to lower my chances of getting bitten or stung? -- You can:   Wear shoes, long-sleeved shirts, and long pants when you go outside. If you are worried about ticks, tuck your pants into your socks and wear light colors so you can spot any ticks that get on you.   Wear bug spray.   Stay inside at gadiel and dusk, when mosquitoes are most active.   Keep your windows closed. If you do open them, make sure that they have screens.   Drain areas of standing water near your home, such as wading pools and buckets. Mosquitoes breed in standing water.   Keep foods and drinks covered when you are outside.   If you see a stinging insect, stay calm and slowly back away.   If you live in an area that has fire ants, avoid stepping on ant mounds.   If you find an insect nest in or near your house, call a pest control service to get rid of the nest safely.   Treat your pets and home for fleas, if needed. Ask your pet's  for advice on how to do this.  What should I do if I am stung by a bee,  wasp, or fire ant? -- If you are stung by a bee or wasp, quickly remove the stinger from your skin if it is still there. If you are stung by a fire ant, kill the ant with a slap as soon as you feel the sting.  Call for an ambulance (in the [place] and [place], call 9-1-1) if you:   Have trouble breathing, become hoarse, or start wheezing (hearing a whistling sound when you breathe)   Start to swell, especially around the face, eyelids, ears, mouth, hands, or feet   Have belly cramps, nausea, vomiting, or diarrhea   Feel dizzy or pass out  All topics are updated as new evidence becomes available and our peer review process is complete.  This topic retrieved from [employer] on: Feb 26, 2024.  Topic 81994 Version 12.0  Release: 32.2.4 - C32.56  © 2024 Up[employer]Date, Inc. and/or its affiliates. All rights reserved.  picture 1: Insect sting     This is a normal reaction to a bee or wasp sting. There can be redness and mild, painful swelling around the spot where the stinger went in. These symptoms usually go away within a few hours.  Graphic 68701 Version 4.0  Consumer Information Use and Disclaimer   Disclaimer: This generalized information is a limited summary of diagnosis, treatment, and/or medication information. It is not meant to be comprehensive and should be used as a tool to help the user understand and/or assess potential diagnostic and treatment options. It does NOT include all information about conditions, treatments, medications, side effects, or risks that may apply to a specific patient. It is not intended to be medical advice or a substitute for the medical advice, diagnosis, or treatment of a health care provider based on the health care provider's examination and assessment of a patient's specific and unique circumstances. Patients must speak with a health care provider for complete information about their health, medical questions, and treatment options, including any risks or benefits regarding use of medications.  This information does not endorse any treatments or medications as safe, effective, or approved for treating a specific patient. Up[employer]Date, Inc. and its affiliates disclaim any warranty or liability relating to this information or the use thereof.The use of this information is governed by the Terms of Use, available at https://www.[employer].com/en/know/clinical-effectiveness-terms. 2024© Up[employer]Date, Inc. and its affiliates and/or licensors. All rights reserved.  Copyright   © 2024 [employer], Inc. and/or its affiliates. All rights reserved.

## 2025-08-04 ENCOUNTER — OFFICE VISIT (OUTPATIENT)
Dept: BARIATRICS | Facility: CLINIC | Age: 50
End: 2025-08-04
Payer: COMMERCIAL

## 2025-08-04 VITALS
OXYGEN SATURATION: 98 % | SYSTOLIC BLOOD PRESSURE: 108 MMHG | TEMPERATURE: 98.2 F | HEART RATE: 92 BPM | WEIGHT: 187 LBS | DIASTOLIC BLOOD PRESSURE: 66 MMHG | BODY MASS INDEX: 33.13 KG/M2 | HEIGHT: 63 IN

## 2025-08-04 DIAGNOSIS — K91.2 POSTSURGICAL MALABSORPTION: ICD-10-CM

## 2025-08-04 DIAGNOSIS — Z90.5 H/O LEFT NEPHRECTOMY: ICD-10-CM

## 2025-08-04 DIAGNOSIS — C64.9 RENAL CELL CARCINOMA, UNSPECIFIED LATERALITY (HCC): ICD-10-CM

## 2025-08-04 DIAGNOSIS — Z98.84 BARIATRIC SURGERY STATUS: ICD-10-CM

## 2025-08-04 DIAGNOSIS — E66.811 OBESITY, CLASS I, BMI 30-34.9: Primary | ICD-10-CM

## 2025-08-04 PROCEDURE — 99214 OFFICE O/P EST MOD 30 MIN: CPT | Performed by: PHYSICIAN ASSISTANT

## 2025-08-04 RX ORDER — MOMETASONE FUROATE 1 MG/ML
LOTION TOPICAL
COMMUNITY
Start: 2025-06-09

## 2025-08-04 RX ORDER — TIRZEPATIDE 5 MG/.5ML
INJECTION, SOLUTION SUBCUTANEOUS
COMMUNITY
Start: 2025-07-14 | End: 2025-08-13 | Stop reason: SDUPTHER

## 2025-08-05 ENCOUNTER — CLINICAL SUPPORT (OUTPATIENT)
Dept: BARIATRICS | Facility: CLINIC | Age: 50
End: 2025-08-05

## 2025-08-05 VITALS — BODY MASS INDEX: 33.73 KG/M2 | WEIGHT: 187.4 LBS

## 2025-08-05 DIAGNOSIS — Z90.3 H/O GASTRIC SLEEVE: ICD-10-CM

## 2025-08-05 DIAGNOSIS — E66.09 CLASS 2 OBESITY DUE TO EXCESS CALORIES WITHOUT SERIOUS COMORBIDITY WITH BODY MASS INDEX (BMI) OF 37.0 TO 37.9 IN ADULT: Primary | ICD-10-CM

## 2025-08-05 DIAGNOSIS — E66.812 CLASS 2 OBESITY DUE TO EXCESS CALORIES WITHOUT SERIOUS COMORBIDITY WITH BODY MASS INDEX (BMI) OF 37.0 TO 37.9 IN ADULT: Primary | ICD-10-CM

## 2025-08-05 PROCEDURE — RECHECK: Performed by: DIETITIAN, REGISTERED
